# Patient Record
(demographics unavailable — no encounter records)

---

## 2024-10-17 NOTE — DISCUSSION/SUMMARY
[FreeTextEntry1] : ---Assessment plan----------The patient has been referred here for further opinion regarding pulmonary problem, Patient is a 60 Y.O. female with history of mild intermittent asthma, PE 2019, emphysema on CT, former smoker,  breast Ca s/p chemo/rads/mastectomy , history of PE on Coumadin  who returns for follow up. history of multiple sclerosis, SVT , breast ca s/p chemotherapy and bilateral mastectomy in 2011 and s/p delayed BRIANNA reconstruction of bilateral breasts (wound vac on abdomen for slow healing).    1) psg  -edelmira -noncomplaint to cpap-wt loss advised---  refd for orodental device---to treat EDELMIRA-----  2)h/o  Bilateral PE's- agree with heme for life long a/c. INR checks by heme- yearly echo to evaluate PA pressures---Echo reviewed from April 2022, unchanged--REPEAT ECHO w/Dr MUELLER---NEEDS REEAPT CT in 2025------  3)  history of breast cancer--/lymphadenopathy -F/U ONCOLOGY  Q3m 4) needs PFT annually 5)cards Dr Mueller -will request echo-report 6) exercise and wt loss discussed- referred to wt management 7) UTD w/flu shot 8) in 4-5m   Thanks for allowing  me to participate  in the care of this patient.  Patient at this time  will follow  the above mentioned recommendations and return back for follow up visit. If you have any questions  I can be reached  at #[phone] (office).  Floridalma Delgado MD, [facility]

## 2024-10-17 NOTE — HISTORY OF PRESENT ILLNESS
[Never] : never [TextBox_4] : This letter  is regarding your patient  who  attended pulmonary out patient office today.  I have reviewed  patient's  past history, social history, family history and medication list. I also  reviewed nurse practitioners/ and fellows  notes and assessment and agree with it.   The patient was referred by Dr. Johnson  61 y/o woman who complained of back pain in July 2019. Admitted to [facility] for PE. Sent home on Lovenox. She has a history of breast cancer. She has a partial sternectomy on July 12, 2019. There is no history of a prior DVT or PE. No know family history of DVT or PE. She is S/P bilateral mastectomy. history of multiple sclerosis, SVT , breast ca s/p chemotherapy and bilateral mastectomy in 2011 and s/p delayed BRIANNA reconstruction of bilateral breasts (wound vac on abdomen for slow healing).   She had some shortness of breath in Octobe 2023- r. A CT angiogram was negative for PE. She is now on warfarin as per her hematologist/oncologist.     ------No history of , fever, chills , rigors, chest pain, or hemoptysis. Questionable history of Raynaud's phenomenon. No h/o significant weight loss in last few months. No history of liver dysfunction , collagen vascular disorder or chronic thromboembolic disease. I would classify the patient's dyspnea as WHO  FUNCTIONAL CLASS II--------  ----Echo  date------2021----ef  57%, normal rv NM Bone scan on 4/29/19 reveals a focus of increased tracer uptake in the lateral aspect of the right sternum corresponding to sclerosis seen on CT.  PSG   2020  --MILD  -RAFFAELE  --AHI 7.1  PFT 2020-------NORMAL  CT chest scan 4/6/19 reveals new area of sclerosis and cortical erosion in the right lateral aspect of the sternum.    -CT Chest 10/31/19: No pulmonary emboli were seen. No pleural effusion. No lymphadenopathy. Emphysema suspected.   cta chest 6/2021 neg for PE   CT CHEST 1/2023 IMPRESSION: Right subpectoral and supraclavicular lymph nodes are unchanged in size and appearance since 12/2/2022 PET/CT exam. No new findings within the chest   PET/CT 12/2022 IMPRESSION: Since prior FDG-PET/CT scan 3/6/2021: 1. Hypermetabolic uptake in multiple RIGHT axillary nodes, including a previously seen nonspecific node, as well as at least 3 NEW examples. Malignant involvement is not excluded. 2. Further involution of previously seen LEFT breast seroma with associated mild uptake. No NEW suspicious findings otherwise seen in that region. 3. Stable morphologic appearance of previously seen postsurgical changes at the sternum, with mildly decreased associated activity. 4. Previously seen tiny loop of small bowel NO longer seems to be interposed between the rectus musculature and LEFT LOWER quadrant ventral herniorrhaphy mesh   CT CHEST IC - ORDERED BY: GUME FLORENCE PROCEDURE DATE: 02/15/2024 COMPARISON: Chest CT 8/29/2023 FINDINGS: Lungs/Airways/Pleura: The central airways are patent. No pleural effusion. Mild apical paraseptal emphysema. Stable 3 mm left upper lobe nodule on series 2, image 37 and previously new 2 mm left upper lobe nodule on image 52. There is a new 3 mm left lower lobe nodule on image 113. Mediastinum/Lymph nodes: Stable subcentimeter right axillary and subpectoral lymph nodes. No new/enlarging thoracic adenopathy. Left axillary karla dissection. Heart and Vessels: The great vessels are normal in size. The heart is normal in size. No pericardial effusion. Upper Abdomen: Cholecystectomy. Unchanged mild nodularity along the anterior abdominal wall may be postoperative. Osseous structures and Soft Tissues: No aggressive bone lesions. Old sternal fracture. Bilateral mastectomies with flap reconstruction. Stable small left breast seroma with asymmetric overlying skin thickening. IMPRESSION: New nonspecific 3 mm left lower lobe nodule compared to August 2023. Attention on follow-up imaging. Stable sub-4 mm left upper lobe nodules, including the previously new 2 mm nodule. Stable subcentimeter right axillary/subpectoral lymph nodes.    PFT 11/20/19: Spirometry was normal. Lung volumes were normal. There was a mild reduction in diffusion. No change after bronchodilator.    sternal lesion found on imaging  by Dr. Syed. Patient underwent sternal biopsy on 5/23/19 which was non diagnostic s/p resection of lesion 2019 by Dr Cooper Results  [id number] 02:28PM  Pathology  [facility]: Jose Accession Number : [id number] Surgical Final Report Final Diagnosis 1. Bone, sternum, coil, extraction - Coiled portions of metal (gross only)  2. Bone, sternum, right partial sternectomy - Bone tissue with histiocytic proliferation and giant cells, (cytokeratin stain AE1:AE3 is negative (2B,2C,2D,2E)  8/11/2020 Patient feeling well, JOHNSON with 3 blocks, no wheezing, last MS flare 3 years ago, last asthma flare was 2 years ago. Does not have symptoms at baseline. + snoring with BMI of 44  8/2020 PFT- Normal delon, lung volume and DLCO, decreased ERV.   feb 2021-----feels better awaiting CPAP, on Coumadin INR being maintained by her PMD needs an echocardiogram-----Dr. cooper has ordered a CT PET to evaluate her sternum history of breast cancer  may 2021=====  -feels better on  CPAP, --------- will obtain compliance report  ----------------on Coumadin INR being maintained by her PMD needs an echocardiogram-----Dr. cooper has ordered a CT PET to evaluate her sternum history of breast cancer-----   9/2021 raffaele- stopped using cpap d/t recall s/p hernia repair- tolerated surgery well- no problems. Has upcoming colonoscopy at [facility] next week no current resp complaints. on lifelong coumadin d/t pMh PE she is up to date w/covid and influenza vac   11/2021 raffaele on cpap and benefits from its nighty use no current resp complaints. on lifelong coumadin d/t pMh PE she is up to date w/covid and influenza vac   1/2022 raffaele on cpap and benefits from its nighty use- s/p mask fitting- awaiting replacement of recalled device Occasional JOHNSON. h/o PE on coumadin- dosed by heme s/p recent breast surgery for cyst of breast she is up to date w/covid and influenza vac  may 2022  -------    c/o pain abdomen-------? pancreatitis----- awaiting ultrasound of the abdomen--using CPAP-----  August 18 2022 -------raffaele cpap compliant some nights, she is worried about the recall---s/o mask fitting---awating replacement of recalled device denies JOHNSON, chest pain, palpation- h/o of PE- on Coumadin- followed by PCP  s/p b/l mastectomy, follows with plastic surgery abdominal pain is better----possible IBS, following with GI and hepatology She denies shortness of breath, cough, and fever   2/13/2023 being worked up for ? recurrence of breast cancer- o/s CT guided biopsy of right breast lesion RAFFAELE- not compliant with cpap use No present resp complaints  CT CHEST 8/2023 IMPRESSION:  Unchanged small right subpectoral and axillary lymph nodes.  Punctate left upper lobe nodule, not definitely on prior's although may have been obscured by dependent atelectasis. Recommend follow-up on subsequent surveillance imaging.   1/2024 s/p parathyroid surgery 2023--- stable from pulm perspective- reports mild JOHNSON which she attributes to being overweight/deconditioned reports breast workup was negative for breast ca- o/s repeat breast CT OSA_ non complaint to cpap Follows cards Dr Mueller who does annual echos bipedal edema- on lasix daily   CT CHEST 2/2024 IMPRESSION: New nonspecific 3 mm left lower lobe nodule compared to August 2023.  Attention on follow-up imaging.  Stable sub-4 mm left upper lobe nodules, including the previously new 2  mm nodule.  Stable subcentimeter right axillary/subpectoral lymph nodes.  PET/CT 4/2024 IMPRESSION: Compared to FDG-PET/CT scan dated 12/2/2022:  1. Few small FDG-avid right axillary/retropectoral lymph nodes are mildly increased in size and metabolism, a difficult to delineate right presacral lymph node is increased in metabolism, and several small FDG-avid right inguinal lymph nodes are unchanged in size and increased in metabolism. Findings are nonspecific. Differential diagnosis includes lymphoma. Metastatic breast cancer is unlikely. Further evaluation may be performed with ultrasound-guided percutaneous needle biopsy.  2. Small FDG-avid focus in anterior abdominal wall, just to the left of midline, and slightly above the level of the umbilicus, not significantly changed, possibly related to prior surgery. Please correlate clinically and with ultrasound.  4/2024 no pulm issues- here to review PET/CT OSA_ non compliant to cpap MS- follows neuro Cards Dr Mueller o/s echo- -- being considered for lymph node biopsy-----no contraindication to the planned procedure from pulmonary point of view-she should bring her CPAP machine on the day of surgery so that it can be used in the postop.  If necessary   10/2024 Obesity/dyastolic dysfunction OSA_ non compliant to cpap- willing to pursue oral dental device MS- follows neuro Cards Dr Mueller- recent echo-report unavailable Elevated LFTs -follows Dr Wolf history of breast cancer--/lymphadenopathy -F/U ONCOLOGY  Q3m [TextBox_19] : NO RESIDUAL EDS  --ESS WAS  3 ON FEB 2023 [ESS] : 3

## 2024-10-17 NOTE — COUNSELING
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

## 2024-10-17 NOTE — HISTORY OF PRESENT ILLNESS
[Never] : never [TextBox_4] : This letter  is regarding your patient  who  attended pulmonary out patient office today.  I have reviewed  patient's  past history, social history, family history and medication list. I also  reviewed nurse practitioners/ and fellows  notes and assessment and agree with it.   The patient was referred by Dr. Johnson  59 y/o woman who complained of back pain in July 2019. Admitted to [facility] for PE. Sent home on Lovenox. She has a history of breast cancer. She has a partial sternectomy on July 12, 2019. There is no history of a prior DVT or PE. No know family history of DVT or PE. She is S/P bilateral mastectomy. history of multiple sclerosis, SVT , breast ca s/p chemotherapy and bilateral mastectomy in 2011 and s/p delayed BRIANNA reconstruction of bilateral breasts (wound vac on abdomen for slow healing).   She had some shortness of breath in Octobe 2023- r. A CT angiogram was negative for PE. She is now on warfarin as per her hematologist/oncologist.     ------No history of , fever, chills , rigors, chest pain, or hemoptysis. Questionable history of Raynaud's phenomenon. No h/o significant weight loss in last few months. No history of liver dysfunction , collagen vascular disorder or chronic thromboembolic disease. I would classify the patient's dyspnea as WHO  FUNCTIONAL CLASS II--------  ----Echo  date------2021----ef  57%, normal rv NM Bone scan on 4/29/19 reveals a focus of increased tracer uptake in the lateral aspect of the right sternum corresponding to sclerosis seen on CT.  PSG   2020  --MILD  -RAFFAELE  --AHI 7.1  PFT 2020-------NORMAL  CT chest scan 4/6/19 reveals new area of sclerosis and cortical erosion in the right lateral aspect of the sternum.    -CT Chest 10/31/19: No pulmonary emboli were seen. No pleural effusion. No lymphadenopathy. Emphysema suspected.   cta chest 6/2021 neg for PE   CT CHEST 1/2023 IMPRESSION: Right subpectoral and supraclavicular lymph nodes are unchanged in size and appearance since 12/2/2022 PET/CT exam. No new findings within the chest   PET/CT 12/2022 IMPRESSION: Since prior FDG-PET/CT scan 3/6/2021: 1. Hypermetabolic uptake in multiple RIGHT axillary nodes, including a previously seen nonspecific node, as well as at least 3 NEW examples. Malignant involvement is not excluded. 2. Further involution of previously seen LEFT breast seroma with associated mild uptake. No NEW suspicious findings otherwise seen in that region. 3. Stable morphologic appearance of previously seen postsurgical changes at the sternum, with mildly decreased associated activity. 4. Previously seen tiny loop of small bowel NO longer seems to be interposed between the rectus musculature and LEFT LOWER quadrant ventral herniorrhaphy mesh   CT CHEST IC - ORDERED BY: GUME FLORENCE PROCEDURE DATE: 02/15/2024 COMPARISON: Chest CT 8/29/2023 FINDINGS: Lungs/Airways/Pleura: The central airways are patent. No pleural effusion. Mild apical paraseptal emphysema. Stable 3 mm left upper lobe nodule on series 2, image 37 and previously new 2 mm left upper lobe nodule on image 52. There is a new 3 mm left lower lobe nodule on image 113. Mediastinum/Lymph nodes: Stable subcentimeter right axillary and subpectoral lymph nodes. No new/enlarging thoracic adenopathy. Left axillary karla dissection. Heart and Vessels: The great vessels are normal in size. The heart is normal in size. No pericardial effusion. Upper Abdomen: Cholecystectomy. Unchanged mild nodularity along the anterior abdominal wall may be postoperative. Osseous structures and Soft Tissues: No aggressive bone lesions. Old sternal fracture. Bilateral mastectomies with flap reconstruction. Stable small left breast seroma with asymmetric overlying skin thickening. IMPRESSION: New nonspecific 3 mm left lower lobe nodule compared to August 2023. Attention on follow-up imaging. Stable sub-4 mm left upper lobe nodules, including the previously new 2 mm nodule. Stable subcentimeter right axillary/subpectoral lymph nodes.    PFT 11/20/19: Spirometry was normal. Lung volumes were normal. There was a mild reduction in diffusion. No change after bronchodilator.    sternal lesion found on imaging  by Dr. Syed. Patient underwent sternal biopsy on 5/23/19 which was non diagnostic s/p resection of lesion 2019 by Dr Cooper Results  [id number] 02:28PM  Pathology  [facility]: Jose Accession Number : [id number] Surgical Final Report Final Diagnosis 1. Bone, sternum, coil, extraction - Coiled portions of metal (gross only)  2. Bone, sternum, right partial sternectomy - Bone tissue with histiocytic proliferation and giant cells, (cytokeratin stain AE1:AE3 is negative (2B,2C,2D,2E)  8/11/2020 Patient feeling well, JOHNSON with 3 blocks, no wheezing, last MS flare 3 years ago, last asthma flare was 2 years ago. Does not have symptoms at baseline. + snoring with BMI of 44  8/2020 PFT- Normal delon, lung volume and DLCO, decreased ERV.   feb 2021-----feels better awaiting CPAP, on Coumadin INR being maintained by her PMD needs an echocardiogram-----Dr. cooper has ordered a CT PET to evaluate her sternum history of breast cancer  may 2021=====  -feels better on  CPAP, --------- will obtain compliance report  ----------------on Coumadin INR being maintained by her PMD needs an echocardiogram-----Dr. cooper has ordered a CT PET to evaluate her sternum history of breast cancer-----   9/2021 raffaele- stopped using cpap d/t recall s/p hernia repair- tolerated surgery well- no problems. Has upcoming colonoscopy at [facility] next week no current resp complaints. on lifelong coumadin d/t pMh PE she is up to date w/covid and influenza vac   11/2021 raffaele on cpap and benefits from its nighty use no current resp complaints. on lifelong coumadin d/t pMh PE she is up to date w/covid and influenza vac   1/2022 raffaele on cpap and benefits from its nighty use- s/p mask fitting- awaiting replacement of recalled device Occasional JOHNSON. h/o PE on coumadin- dosed by heme s/p recent breast surgery for cyst of breast she is up to date w/covid and influenza vac  may 2022  -------    c/o pain abdomen-------? pancreatitis----- awaiting ultrasound of the abdomen--using CPAP-----  August 18 2022 -------raffaele cpap compliant some nights, she is worried about the recall---s/o mask fitting---awating replacement of recalled device denies JOHNSON, chest pain, palpation- h/o of PE- on Coumadin- followed by PCP  s/p b/l mastectomy, follows with plastic surgery abdominal pain is better----possible IBS, following with GI and hepatology She denies shortness of breath, cough, and fever   2/13/2023 being worked up for ? recurrence of breast cancer- o/s CT guided biopsy of right breast lesion RAFFAELE- not compliant with cpap use No present resp complaints  CT CHEST 8/2023 IMPRESSION:  Unchanged small right subpectoral and axillary lymph nodes.  Punctate left upper lobe nodule, not definitely on prior's although may have been obscured by dependent atelectasis. Recommend follow-up on subsequent surveillance imaging.   1/2024 s/p parathyroid surgery 2023--- stable from pulm perspective- reports mild JOHNSON which she attributes to being overweight/deconditioned reports breast workup was negative for breast ca- o/s repeat breast CT OSA_ non complaint to cpap Follows cards Dr Mueller who does annual echos bipedal edema- on lasix daily   CT CHEST 2/2024 IMPRESSION: New nonspecific 3 mm left lower lobe nodule compared to August 2023.  Attention on follow-up imaging.  Stable sub-4 mm left upper lobe nodules, including the previously new 2  mm nodule.  Stable subcentimeter right axillary/subpectoral lymph nodes.  PET/CT 4/2024 IMPRESSION: Compared to FDG-PET/CT scan dated 12/2/2022:  1. Few small FDG-avid right axillary/retropectoral lymph nodes are mildly increased in size and metabolism, a difficult to delineate right presacral lymph node is increased in metabolism, and several small FDG-avid right inguinal lymph nodes are unchanged in size and increased in metabolism. Findings are nonspecific. Differential diagnosis includes lymphoma. Metastatic breast cancer is unlikely. Further evaluation may be performed with ultrasound-guided percutaneous needle biopsy.  2. Small FDG-avid focus in anterior abdominal wall, just to the left of midline, and slightly above the level of the umbilicus, not significantly changed, possibly related to prior surgery. Please correlate clinically and with ultrasound.  4/2024 no pulm issues- here to review PET/CT OSA_ non compliant to cpap MS- follows neuro Cards Dr Mueller o/s echo- -- being considered for lymph node biopsy-----no contraindication to the planned procedure from pulmonary point of view-she should bring her CPAP machine on the day of surgery so that it can be used in the postop.  If necessary   10/2024 Obesity/dyastolic dysfunction OSA_ non compliant to cpap- willing to pursue oral dental device MS- follows neuro Cards Dr Mueller- recent echo-report unavailable Elevated LFTs -follows Dr Wolf history of breast cancer--/lymphadenopathy -F/U ONCOLOGY  Q3m [TextBox_19] : NO RESIDUAL EDS  --ESS WAS  3 ON FEB 2023 [ESS] : 3

## 2024-10-17 NOTE — PHYSICAL EXAM
[No Acute Distress] : no acute distress [Well Nourished] : well nourished [Normal Appearance] : normal appearance [Supple] : supple [No Neck Mass] : no neck mass [No JVD] : no jvd [Normal Rate/Rhythm] : normal rate/rhythm [Normal S1, S2] : normal s1, s2 [No Murmurs] : no murmurs [No Resp Distress] : no resp distress [Clear to Auscultation Bilaterally] : clear to auscultation bilaterally [No Abnormalities] : no abnormalities [Benign] : benign [Normal Gait] : normal gait [No Clubbing] : no clubbing [No Cyanosis] : no cyanosis [No Edema] : no edema [Normal Color/ Pigmentation] : normal color/ pigmentation [No Focal Deficits] : no focal deficits [Oriented x3] : oriented x3 [Normal Affect] : normal affect [TextBox_11] : Unable to examined due to mask [TextBox_44] : Known thyroid nodule  [TextBox_89] : Obese

## 2024-10-17 NOTE — END OF VISIT
[Time Spent: ___ minutes] : I have spent [unfilled] minutes of time on the encounter which excludes teaching and separately reported services. [FreeTextEntry3] :   I, Dr. Floridalma Delgado  personally performed the evaluation and management (E/M) services for this established patient who presents today with (a) new problem(s)/exacerbation of (an) existing condition(s). That E/M includes conducting the clinically appropriate interval history &/or exam, assessing all new/exacerbated conditions, and establishing a new plan of care. Today, my PANDA, Lori Hernandez NP, , was here to observe my evaluation and management service for this new problem/exacerbated condition and follow the plan of care established by me going forward.

## 2024-10-17 NOTE — DISCUSSION/SUMMARY
[FreeTextEntry1] : ---Assessment plan----------The patient has been referred here for further opinion regarding pulmonary problem, Patient is a 60 Y.O. female with history of mild intermittent asthma, PE 2019, emphysema on CT, former smoker,  breast Ca s/p chemo/rads/mastectomy , history of PE on Coumadin  who returns for follow up. history of multiple sclerosis, SVT , breast ca s/p chemotherapy and bilateral mastectomy in 2011 and s/p delayed BRIANNA reconstruction of bilateral breasts (wound vac on abdomen for slow healing).    1) psg  -edelmira -noncomplaint to cpap-wt loss advised---  refd for orodental device---to treat EDELMIRA-----  2)h/o  Bilateral PE's- agree with heme for life long a/c. INR checks by heme- yearly echo to evaluate PA pressures---Echo reviewed from April 2022, unchanged--REPEAT ECHO w/Dr MUELLER---NEEDS REEAPT CT in 2025------  3)  history of breast cancer--/lymphadenopathy -F/U ONCOLOGY  Q3m 4) needs PFT annually 5)cards Dr Mueller -will request echo-report 6) exercise and wt loss discussed- referred to wt management 7) UTD w/flu shot 8) in 4-5m   Thanks for allowing  me to participate  in the care of this patient.  Patient at this time  will follow  the above mentioned recommendations and return back for follow up visit. If you have any questions  I can be reached  at #[phone] (office).  Floridalma Delgado MD, [facility]

## 2024-10-26 NOTE — PHYSICAL EXAM
[Non-Tender] : non-tender [Smooth] : smooth [General Appearance - Alert] : alert [General Appearance - In No Acute Distress] : in no acute distress [General Appearance - Well Nourished] : well nourished [General Appearance - Well Developed] : well developed [Sclera] : the sclera and conjunctiva were normal [Hearing Threshold Finger Rub Not [place]] : hearing was normal [Oropharynx] : the oropharynx was normal [Respiration, Rhythm And Depth] : normal respiratory rhythm and effort [Exaggerated Use Of Accessory Muscles For Inspiration] : no accessory muscle use [Auscultation Breath Sounds / Voice Sounds] : lungs were clear to auscultation bilaterally [Heart Rate And Rhythm] : heart rate was normal and rhythm regular [Heart Sounds] : normal S1 and S2 [Murmurs] : no murmurs [Edema] : there was no peripheral edema [Bowel Sounds] : normal bowel sounds [Abdomen Soft] : soft [Abdomen Tenderness] : non-tender [Abdomen Mass (___ Cm)] : no abdominal mass palpated [Abnormal Walk] : normal gait [Nail Clubbing] : no clubbing  or cyanosis of the fingernails [Involuntary Movements] : no involuntary movements were seen [Skin Color & Pigmentation] : normal skin color and pigmentation [Skin Turgor] : normal skin turgor [] : no rash [Oriented To Time, Place, And Person] : oriented to person, place, and time [Impaired Insight] : insight and judgment were intact [Affect] : the affect was normal [Mood] : the mood was normal [Memory Recent] : recent memory was not impaired [Memory Remote] : remote memory was not impaired [Scleral Icterus] : No Scleral Icterus [Spider Angioma] : No spider angioma(s) were observed [Abdominal  Ascites] : no ascites [Splenomegaly] : no splenomegaly [Caput Medusae] : no caput medusae observed [Asterixis] : no asterixis observed [Jaundice] : No jaundice [Palmar Erythema] : no Palmar Erythema

## 2024-10-26 NOTE — PHYSICAL EXAM
[Non-Tender] : non-tender [Smooth] : smooth [General Appearance - Alert] : alert [General Appearance - In No Acute Distress] : in no acute distress [General Appearance - Well Nourished] : well nourished [General Appearance - Well Developed] : well developed [Sclera] : the sclera and conjunctiva were normal [Hearing Threshold Finger Rub Not [place]] : hearing was normal [Oropharynx] : the oropharynx was normal [Respiration, Rhythm And Depth] : normal respiratory rhythm and effort [Exaggerated Use Of Accessory Muscles For Inspiration] : no accessory muscle use [Auscultation Breath Sounds / Voice Sounds] : lungs were clear to auscultation bilaterally [Heart Rate And Rhythm] : heart rate was normal and rhythm regular [Heart Sounds] : normal S1 and S2 [Murmurs] : no murmurs [Edema] : there was no peripheral edema [Bowel Sounds] : normal bowel sounds [Abdomen Soft] : soft [Abdomen Tenderness] : non-tender [Abdomen Mass (___ Cm)] : no abdominal mass palpated [Abnormal Walk] : normal gait [Nail Clubbing] : no clubbing  or cyanosis of the fingernails [Involuntary Movements] : no involuntary movements were seen [Skin Color & Pigmentation] : normal skin color and pigmentation [Skin Turgor] : normal skin turgor [] : no rash [Oriented To Time, Place, And Person] : oriented to person, place, and time [Impaired Insight] : insight and judgment were intact [Affect] : the affect was normal [Mood] : the mood was normal [Memory Recent] : recent memory was not impaired [Memory Remote] : remote memory was not impaired [Scleral Icterus] : No Scleral Icterus [Spider Angioma] : No spider angioma(s) were observed [Abdominal  Ascites] : no ascites [Splenomegaly] : no splenomegaly [Caput Medusae] : no caput medusae observed [Asterixis] : no asterixis observed [Jaundice] : No jaundice [Palmar Erythema] : no Palmar Erythema

## 2024-10-26 NOTE — HISTORY OF PRESENT ILLNESS
[FreeTextEntry1] : Ms. Chaudhari is a 59 yo F with obesity, dyslipidemia, RAFFAELE, IBS, multiple sclerosis (on Vumerity), history of SVT (s/p ablation in 2012), asthma, GERD, gastroparesis (s/p pyloric dilation in 9/2021), urolithiasis, history of bilateral PEs (2019, on lifelong anticoagulation with warfarin), history of cholecystectomy, history of hysterectomy, history of umbilical hernia repair, and a history of left breast cancer (diagnosed in 11/2010, s/p neoadjuvant chemotherapy, s/p bilateral mastectomy 5/2011 with subsequent reconstruction, s/p excisional biopsy of right axillary lymph node on 3/14/18 that was benign, s/p partial sternectomy on 7/12/19 that was benign, s/p revision of her bilateral reconstructed breasts with exploration of the left breast, removal of seroma, fat grafting, liposuction 1/10/22, and s/p right axillary lymph node excision on 5/17/2024 that had no overt evidence of malignancy), iron deficiency anemia (on IV iron and planned for video capsule endoscopy on 10/24/24), and metabolic dysfunction-associated steatohepatitis (MASLD).  FibroScan (10/6/22): Median liver stiffness 5.9 kPa (normal, consistent with F0-1 fibrosis) and CAP score 302 dB/m (consistent with S1 steatosis).  FibroScan (10/11/23): Median liver stiffness 5.2 kPa (normal, consistent with F0-1 fibrosis) and CAP score 282 dB/m (consistent with S0-1 steatosis).  FibroScan (10/16/24): Median liver stiffness 5.3 kPa (normal, consistent with F0-1 fibrosis) and CAP score 264 dB/m (consistent with S0-1 steatosis).  She was last seen in this office on 10/11/23 (1 year ago) and is here today for routine follow-up and repeat FibroScan (above). She has been having frequent cramping abdominal pain daily and is planned for further endoscopic work-up with her gastroenterologist including video capsule endoscopy on 10/24/24, also due to recent iron deficiency anemia for which she has been on IV iron for the past month. She has alternating constipation and diarrhea. No overt GI bleeding. She has lost 4 lbs since last year but has struggled to lose further weight despite reportedly eating less due to her recent GI symptoms. She has limited intake of vegetables due to warfarin as well as fears of exacerbating her GI symptoms. She also has not been exercising because of her symptoms.  She has no known family history of liver disease. Her son has ESRD on HD and previously had bariatric surgery in order to become eligible for kidney transplantation.  She drinks alcohol rarely, maybe a glass of wine or wine cooler once a month or less. She is a former smoker and quit in 2010 when diagnosed with breast cancer. She used to smoke marijuana but none recently. No history of intranasal or intravenous drug use. She is retired but previously worked for the [employer] for 37 years. She has a left shoulder tattoo done by a  at a "tattoo party" at a friend's house many years ago (>10 years ago). She lives with her adult son and also has an adult daughter and two grandchildren (one from each of her children).

## 2024-10-26 NOTE — ASSESSMENT
[FreeTextEntry1] : # Metabolic dysfunction-associated steatotic liver disease (MASLD): - She has no reported history of risky alcohol consumption. Prior laboratory work-up for other causes of chronic liver disease was notable for positive JOSE ANGEL with 1:320 titer and weakly positive ASMA with 1:20 titer, however, IgG was within normal limits and the pattern of her liver enzyme elevations (recently with normal AST and ALT and only mildly elevated ALP) is not suggestive of autoimmune hepatitis. No current plan for biopsy, especially given her reassuring serial FibroScan results which suggest no significant hepatic fibrosis (F0-1). - We discussed the presumed diagnosis of MASLD including its natural history, evaluation and staging of the disease including her FibroScan results, and prognosis, including possible risks of development of compensated cirrhosis, decompensated cirrhosis, and hepatocellular carcinoma (HCC) as well as increased risks of diabetes mellitus, chronic kidney disease, and cardiovascular disease were discussed.  - We discussed that lifestyle modifications are crucial for management of MASLD. I recommended gradual weight loss of at least 10% of her body weight, though provided emotional support that this can be difficult to achieve, especially as she has already tried multiple diets and is currently limited due to her GI symptomatology. At her prior visit in 10/2023, she was provided with the phone number for the [facility] at [facility] for consideration of possible pharmacologic therapy for obesity (apart from GLP-1 RA which she is not a candidate for due to her gastroparesis) as well as in-person regular dietician consultation and follow-up. At her visit in 10/2023, we had also discussed trying Weight Watchers as an alternative to aim for daily 250-500 calorie deficit for consistent weight loss. She also previously had a consultation with a bariatric surgeon but was felt to be high risk due to her history of PEs. Today, she expressed frustration about an over-focus by multiple providers on her weight so we opted instead to focus on other potential healthy lifestyle changes as well as reassurance about the overall stable status of her MASLD based on serial FibroScan measurements. I recommended dietary changes as tolerated including coffee consumption (up to 3-4 cups/day if desired), adherence to a Mediterranean style diet with increased consumption of vegetables and lean proteins, avoidance of red meat and high fructose corn syrup, and avoidance of calorie-containing beverages. I recommended moderate intensity exercise for a minimum of 20-30 minutes at least 3 times per week and ideally 120-150 minutes/week. These changes have been shown to lead to regression or even resolution of steatosis, inflammation, and even fibrosis in some patients. - She is not a candidate for resmetirom given that she does not appear to have stage 2-3 fibrosis. She is not a candidate for off label GLP-1 RA therapy due to gastroparesis.   # Health maintenance: - Ms. KOEHLER was counseled to: abstain from alcohol; avoid use of herbal and dietary supplements due to potential hepatotoxicity; and limit use of acetaminophen to <2 grams per day. - She was advised to follow-up with her GI re: her GI symptomatology and iron deficiency anemia as planned. - She is HAV and HBV immune.  Next follow-up: 2 years, with same-day FibroScan

## 2024-10-26 NOTE — REVIEW OF SYSTEMS
[Negative] : Heme/Lymph [Abdominal Pain] : abdominal pain [Constipation] : constipation [Diarrhea] : diarrhea [Swollen Glands] : swollen glands [As Noted in HPI] : as noted in HPI [Feeling Poorly] : feeling poorly

## 2024-10-26 NOTE — HISTORY OF PRESENT ILLNESS
[FreeTextEntry1] : Ms. Chaudhari is a 61 yo F with obesity, dyslipidemia, RAFFAELE, IBS, multiple sclerosis (on Vumerity), history of SVT (s/p ablation in 2012), asthma, GERD, gastroparesis (s/p pyloric dilation in 9/2021), urolithiasis, history of bilateral PEs (2019, on lifelong anticoagulation with warfarin), history of cholecystectomy, history of hysterectomy, history of umbilical hernia repair, and a history of left breast cancer (diagnosed in 11/2010, s/p neoadjuvant chemotherapy, s/p bilateral mastectomy 5/2011 with subsequent reconstruction, s/p excisional biopsy of right axillary lymph node on 3/14/18 that was benign, s/p partial sternectomy on 7/12/19 that was benign, s/p revision of her bilateral reconstructed breasts with exploration of the left breast, removal of seroma, fat grafting, liposuction 1/10/22, and s/p right axillary lymph node excision on 5/17/2024 that had no overt evidence of malignancy), iron deficiency anemia (on IV iron and planned for video capsule endoscopy on 10/24/24), and metabolic dysfunction-associated steatohepatitis (MASLD).  FibroScan (10/6/22): Median liver stiffness 5.9 kPa (normal, consistent with F0-1 fibrosis) and CAP score 302 dB/m (consistent with S1 steatosis).  FibroScan (10/11/23): Median liver stiffness 5.2 kPa (normal, consistent with F0-1 fibrosis) and CAP score 282 dB/m (consistent with S0-1 steatosis).  FibroScan (10/16/24): Median liver stiffness 5.3 kPa (normal, consistent with F0-1 fibrosis) and CAP score 264 dB/m (consistent with S0-1 steatosis).  She was last seen in this office on 10/11/23 (1 year ago) and is here today for routine follow-up and repeat FibroScan (above). She has been having frequent cramping abdominal pain daily and is planned for further endoscopic work-up with her gastroenterologist including video capsule endoscopy on 10/24/24, also due to recent iron deficiency anemia for which she has been on IV iron for the past month. She has alternating constipation and diarrhea. No overt GI bleeding. She has lost 4 lbs since last year but has struggled to lose further weight despite reportedly eating less due to her recent GI symptoms. She has limited intake of vegetables due to warfarin as well as fears of exacerbating her GI symptoms. She also has not been exercising because of her symptoms.  She has no known family history of liver disease. Her son has ESRD on HD and previously had bariatric surgery in order to become eligible for kidney transplantation.  She drinks alcohol rarely, maybe a glass of wine or wine cooler once a month or less. She is a former smoker and quit in 2010 when diagnosed with breast cancer. She used to smoke marijuana but none recently. No history of intranasal or intravenous drug use. She is retired but previously worked for the [employer] for 37 years. She has a left shoulder tattoo done by a  at a "tattoo party" at a friend's house many years ago (>10 years ago). She lives with her adult son and also has an adult daughter and two grandchildren (one from each of her children).

## 2024-10-26 NOTE — ASSESSMENT
[FreeTextEntry1] : # Metabolic dysfunction-associated steatotic liver disease (MASLD): - She has no reported history of risky alcohol consumption. Prior laboratory work-up for other causes of chronic liver disease was notable for positive JOSE ANGEL with 1:320 titer and weakly positive ASMA with 1:20 titer, however, IgG was within normal limits and the pattern of her liver enzyme elevations (recently with normal AST and ALT and only mildly elevated ALP) is not suggestive of autoimmune hepatitis. No current plan for biopsy, especially given her reassuring serial FibroScan results which suggest no significant hepatic fibrosis (F0-1). - We discussed the presumed diagnosis of MASLD including its natural history, evaluation and staging of the disease including her FibroScan results, and prognosis, including possible risks of development of compensated cirrhosis, decompensated cirrhosis, and hepatocellular carcinoma (HCC) as well as increased risks of diabetes mellitus, chronic kidney disease, and cardiovascular disease were discussed.  - We discussed that lifestyle modifications are crucial for management of MASLD. I recommended gradual weight loss of at least 10% of her body weight, though provided emotional support that this can be difficult to achieve, especially as she has already tried multiple diets and is currently limited due to her GI symptomatology. At her prior visit in 10/2023, she was provided with the phone number for the [facility] Management Center at [facility] for consideration of possible pharmacologic therapy for obesity (apart from GLP-1 RA which she is not a candidate for due to her gastroparesis) as well as in-person regular dietician consultation and follow-up. At her visit in 10/2023, we had also discussed trying Weight Watchers as an alternative to aim for daily 250-500 calorie deficit for consistent weight loss. She also previously had a consultation with a bariatric surgeon but was felt to be high risk due to her history of PEs. Today, she expressed frustration about an over-focus by multiple providers on her weight so we opted instead to focus on other potential healthy lifestyle changes as well as reassurance about the overall stable status of her MASLD based on serial FibroScan measurements. I recommended dietary changes as tolerated including coffee consumption (up to 3-4 cups/day if desired), adherence to a Mediterranean style diet with increased consumption of vegetables and lean proteins, avoidance of red meat and high fructose corn syrup, and avoidance of calorie-containing beverages. I recommended moderate intensity exercise for a minimum of 20-30 minutes at least 3 times per week and ideally 120-150 minutes/week. These changes have been shown to lead to regression or even resolution of steatosis, inflammation, and even fibrosis in some patients. - She is not a candidate for resmetirom given that she does not appear to have stage 2-3 fibrosis. She is not a candidate for off label GLP-1 RA therapy due to gastroparesis.   # Health maintenance: - Ms. KOEHLER was counseled to: abstain from alcohol; avoid use of herbal and dietary supplements due to potential hepatotoxicity; and limit use of acetaminophen to <2 grams per day. - She was advised to follow-up with her GI re: her GI symptomatology and iron deficiency anemia as planned. - She is HAV and HBV immune.  Next follow-up: 2 years, with same-day FibroScan

## 2024-12-10 NOTE — ASSESSMENT
[FreeTextEntry1] : Ann Marie will follow-up in 1 year, she will continue close follow-up with Dr. Syed and rheumatology.   All medical entries were at my, Dr. Kane Mooney, direction. I have reviewed the chart and agree that the record accurately reflects my personal performance of the history, physical exam, assessment, and plan.  Our office nurse practitioner was present for the duration of the office visit.

## 2024-12-10 NOTE — PHYSICAL EXAM
[Normal] : supple, no neck mass and thyroid not enlarged [Normal Neck Lymph Nodes] : normal neck lymph nodes  [Normal Supraclavicular Lymph Nodes] : normal supraclavicular lymph nodes [Normal Groin Lymph Nodes] : normal groin lymph nodes [Normal Axillary Lymph Nodes] : normal axillary lymph nodes [Normal] : oriented to person, place and time, with appropriate affect [FreeTextEntry1] : SS present during physical exam.  [de-identified] : right axillary incision well healed

## 2024-12-10 NOTE — PHYSICAL EXAM
[Normal] : supple, no neck mass and thyroid not enlarged [Normal Neck Lymph Nodes] : normal neck lymph nodes  [Normal Supraclavicular Lymph Nodes] : normal supraclavicular lymph nodes [Normal Groin Lymph Nodes] : normal groin lymph nodes [Normal Axillary Lymph Nodes] : normal axillary lymph nodes [Normal] : oriented to person, place and time, with appropriate affect [FreeTextEntry1] : SS present during physical exam.  [de-identified] : right axillary incision well healed

## 2024-12-10 NOTE — HISTORY OF PRESENT ILLNESS
[de-identified] : Ms. JANETH KOEHLER is a 60-year-old woman here for a follow-up visit.   She underwent bilateral mastectomies in November 2010.  She was seen for initial consultation in December 2017 for evaluation of right axillary lymphadenopathy and a history of left sided breast cancer.   She was referred for a CTA of the chest in August 2017 given complaints of dyspnea, which was negative for PE but revealed right-sided axillary adenopathy measuring up to 1.9 cm.  She was referred for a bilateral breast/chest wall ultrasound in November 2017, which revealed benign appearing nodules in the right breast for which a 6 month follow up exam was recommended, and an unremarkable axillary lymph node with normal fatty hilum (BIRADS 3).  She has also been experiencing abdominal/epigastric pain of unclear etiology.  She has been worked up by GI, Cardiology and neurology with no significant findings.  She had a recent repeat EGD (7/2018) and CT (6/2018) which were both unrevealing.  She will continue to follow up with Dr. Hernandez regarding this.  She was prescribed Hyoscyamine and Baclofen.  She was subsequently referred for a breast MRI which showed right axillary adenopathy (3.4 cm) for which FNA was recommended.  FNA was done on 1/9/18 and was negative for malignant cells and consistent with a reactive lymph node.  As such, we agreed to short term surveillance with a follow up ultrasound.  **SURGERY** After discussing the case with radiology, we decided to proceed with an excisional biopsy.  She is status post excisional biopsy of right axillary lymph node on 3/14/18.  Final pathology was consistent with lymph nodes with follicular hyperplasia and plasmacytosis.  No diagnostic abnormalities were seen on flow cytometry.  US bilateral breasts 9/30/20: No sonographic evidence of malignancy.  Further management of pain should be based clinically, BI-RADS 1.   MR Bilateral Breasts 10/30/20: No MRI evidence of malignancy, BI-RADS 2.   She underwent breast reconstruction revision in December 2020.   CT Abdomen/Pelvis 2/11/21: Tiny portion of small bowel interposed between the rectus musculature and ventral hernia mesh in the left lower quadrant. No hernia identified. No bowel obstruction.  PET/CT Skull to Thigh 3/4/21:  1)Patient is status post interval removal of bilateral breast implants with BRIANNA flap reconstruction, as seen on interval CT scans. In anterior aspect of reconstructed left breast, there is a photopenic density which measures slightly higher than simple fluid and demonstrate minimal, peripheral FDG activity. This increased in size since 10/31/2019, measuring approximately 9.2 x 2.8 cm (SUV 2.3; image 81), as compared to 4.3 x 1.2 cm on 10/31/2019. No suspicious enhancement is seen in reconstructed left breast on MRI from 10/30/2020. This may represent a postoperative seroma. Ultrasound is recommended for further evaluation.  2. Status post interval partial right sternectomy with chronic fracture deformity of mid sternum as seen on interval CT scans. There is a small focus of mild FDG activity associated with the fracture deformity which is unchanged in appearance on interval CT scans. Resolution of infiltrative changes seen in chest wall, surrounding sternal fracture deformity, on interval CT scans. 3. New nonspecific subcentimeter FDG-avid right axillary lymph node. 4. A tiny portion of small bowel is interposed between the rectus musculature and the ventral hernia mesh in the left lower quadrant, as seen on CT dated 2/11/2021.  US Breast Bilateral 3/22/21: Large complicated fluid collection in the reconstructed left upper breast with internal debris and septations, new since prior breast MRI from 10/2020. The differential considerations include inflammation/infection, however malignant recurrence or implant associated lymphoma cannot be excluded. Ultrasound-guided aspiration is recommended. Further management and assessment on clinical grounds is also suggested.  BI-RADS 4A.   BREAST, LEFT UPPER, US GUIDED FNA 4/6/21:  NEGATIVE FOR MALIGNANT CELLS. (Results are BENIGN AND CONCORDANT).  She underwent umbilical revision on 4/16/21.   She was referred for a CTA of the chest on 6/13/21 to evaluate shortness of breath, which was negative for PE but demonstrated a 8 x 1.8 cm left breast collection (previously aspirated).  This has been asymptomatic, however, she briefly discussed surgical intervention for definitive treatment with Dr. Vasquez.  She subsequently underwent a  follow up breast ultrasound on 9/1/21 which revealed an 8.3 x 4.3 x 0.4 cm fluid collection in the upper reconstructed left breast at the scar site, decreased in size from prior study (BIRADS 2).   Her past medical history is otherwise notable for SVT (s/p cardiac ablation 2012- currently on metoprolol), MS, GERD.  Prior surgeries include a cholecystectomy, hysterectomy and umbilical hernia repair.  B/L breast U/S from 11/29/22 showed probable fat necrosis in the right breast at 2:00, 7 cm from the nipple, at the medial portion of the scar site. Follow-up sonogram in 6 months is recommended to ascertain stability. BI-RADS 3 Breast MRI 12/2/22 with benign findings, BI-RADS 2.   12/22/2022- Janeth presents for a follow-up visit, she is most recently s/p revision of her B/L reconstructed breast with exploration of left breast, seroma removal, fat grafting and liposuction in January of 2022 w/ Dr. Vasquez.  PET/CT from 12/2022 shows new hypermetabolic uptake in multiple right axillary nodes, at least 3 new examples. Dr. Syed then sent her back to me to discuss new findings. She is also undergoing multiple GI testing with Dr. Harris and parathyroid work-up with Dr. Cazares. Janeth will undergo as right ultrasound guided biopsy of the axillary lymph nodes. She will follow up with us shortly thereafter.  Spoke with Janeth on 1/7/2023, attempt at US biopsy showed no discreet LN, will order a chest CT to reevaluate.   CT chest from 1/15/2023 showed right subpectoral and supraclavicular lymph nodes are unchanged in size and appearance since 12/2/2022 PET/CT exam. No new findings within the chest  1/26/2023: Janeth returns to discuss her CT scan findings.  She does mention occasional night sweats and fatigue as constitutional symptoms. I will review her CT imaging with radiology.  She may be amenable to a CT-guided biopsy.  We will touch base next week.  s/p CT guided bx of retropectoral LN on 3/10/2023 favors reactive LN.  B/L breast U/S 5/31/2023, BI-RADS 2 --- however there were no comments made on her axilla regions. right axilla U/S 7/14/2023 - no evidence of LAD, previously biopsied right retro pectoral LN not seen   CT chest 8/29/2023 - unchanged right subpectoral & axillary LN   9/21/2023 - Janeth returns for ongoing follow-up, she does still report night sweats but otherwise no remaining B symptoms. No LAD on physical exam and she otherwise is in her usual state of health.   CT chest 2/2024 - new nonspecific 3 mm LLL nodule, compared to 8/2023 - stable sub-4 mm DARRYL nodules, including previously new 2 mm nodule  - stable sub centimeter right axillary/subpectoral LN   3/21/2024 - Janeth returns for ongoing follow-up, she is feeling well but does report ongoing night sweats 3-4 nights a week. She has been dealing with some GI issues/IBS but otherwise denies any new health issues.   PET/CT 4/7/2024: Few small FDG-avid right axillary/retropectoral lymph nodes are mildly increased in size and metabolism, a difficult to delineate right presacral lymph node is increased in metabolism, and several small FDG-avid right inguinal lymph nodes are unchanged in size and increased in metabolism. Findings are nonspecific. Differential diagnosis includes lymphoma. Metastatic breast cancer is unlikely. Further evaluation may be performed with ultrasound-guided percutaneous needle biopsy. Small FDG-avid focus in anterior abdominal wall, just to the left of midline, and slightly above the level of the umbilicus, not significantly changed, possibly related to prior surgery. Please correlate clinically and with ultrasound.  5/13/2024- Janeth returns for a presurgical consultation and to evaluate abdominal wall findings on PET/CT.  Janeth is scheduled for surgery 5/17.    **SURGERY** She is status post right axillary lymph node excision on 5/17/2024.  Pathology revealed follicular and paracortical hyperplasia with NO overt evidence of lymphoma or carcinoma.     Postop course complicated by hematoma for which she underwent wash out/evacuation of right axillary hematoma on 5/20/2024.  Hospital course prolonged due to inability to achieve therapeutic anticoagulation.  6/10/2024: Janeth is feeling well.  Incision is healing appropriately, and she denies any further drainage, no swelling or pain.  Therapeutic on anticoagulation at this point. Janeth is doing well.  She will follow up with us in 6 months.  CT C/A/P 8/26/2024 - no evidence of metastatic disease - post op changes in left breast, similar to prior CT  - no LAD, previously noted hematoma to right axilla no longer seen, surgical clips to B/L axilla   9/19/2024 - Janeth returns for ongoing follow-up, she continues to report persistent night sweats and intermittently during the day. She recently recovered from a bout of GI issues (Sapovirus, E. coli, and C. diff that were identified by GI PCR and treated with ABTs). She is otherwise in her usual state of health and denies any new/worrisome symptoms.  Janeth will follow-up in 1 year, she will continue close follow-up with Dr. Syed and possibly rheumatology.   12/10/2024- Janeth returns for ongoing follow-up, she is feeling well overall, still dealing with some GI issues but actively following w/ GI. She continues to have persistent night sweats but has established care w/ Dr. Zita Segura from [facility] who is doing the full work-up. She otherwise denies any new or worrisome symptoms.

## 2024-12-10 NOTE — CONSULT LETTER
[Dear  ___] : Dear ~PRANEETH, [Consult Letter:] : I had the pleasure of evaluating your patient, [unfilled]. [Please see my note below.] : Please see my note below. [Consult Closing:] : Thank you very much for allowing me to participate in the care of this patient.  If you have any questions, please do not hesitate to contact me. [Sincerely,] : Sincerely, [DrLane  ___] : Dr. BUCKLEY [DrLane ___] : Dr. BUCKLEY [FreeTextEntry2] : All Syed, DO [FreeTextEntry3] : Kane Mooney MD Surgical Oncology [facility] Office: [phone] Cell: [phone]

## 2024-12-10 NOTE — HISTORY OF PRESENT ILLNESS
[de-identified] : Ms. JANETH KOEHLER is a 60-year-old woman here for a follow-up visit.   She underwent bilateral mastectomies in November 2010.  She was seen for initial consultation in December 2017 for evaluation of right axillary lymphadenopathy and a history of left sided breast cancer.   She was referred for a CTA of the chest in August 2017 given complaints of dyspnea, which was negative for PE but revealed right-sided axillary adenopathy measuring up to 1.9 cm.  She was referred for a bilateral breast/chest wall ultrasound in November 2017, which revealed benign appearing nodules in the right breast for which a 6 month follow up exam was recommended, and an unremarkable axillary lymph node with normal fatty hilum (BIRADS 3).  She has also been experiencing abdominal/epigastric pain of unclear etiology.  She has been worked up by GI, Cardiology and neurology with no significant findings.  She had a recent repeat EGD (7/2018) and CT (6/2018) which were both unrevealing.  She will continue to follow up with Dr. Hernandez regarding this.  She was prescribed Hyoscyamine and Baclofen.  She was subsequently referred for a breast MRI which showed right axillary adenopathy (3.4 cm) for which FNA was recommended.  FNA was done on 1/9/18 and was negative for malignant cells and consistent with a reactive lymph node.  As such, we agreed to short term surveillance with a follow up ultrasound.  **SURGERY** After discussing the case with radiology, we decided to proceed with an excisional biopsy.  She is status post excisional biopsy of right axillary lymph node on 3/14/18.  Final pathology was consistent with lymph nodes with follicular hyperplasia and plasmacytosis.  No diagnostic abnormalities were seen on flow cytometry.  US bilateral breasts 9/30/20: No sonographic evidence of malignancy.  Further management of pain should be based clinically, BI-RADS 1.   MR Bilateral Breasts 10/30/20: No MRI evidence of malignancy, BI-RADS 2.   She underwent breast reconstruction revision in December 2020.   CT Abdomen/Pelvis 2/11/21: Tiny portion of small bowel interposed between the rectus musculature and ventral hernia mesh in the left lower quadrant. No hernia identified. No bowel obstruction.  PET/CT Skull to Thigh 3/4/21:  1)Patient is status post interval removal of bilateral breast implants with BRIANNA flap reconstruction, as seen on interval CT scans. In anterior aspect of reconstructed left breast, there is a photopenic density which measures slightly higher than simple fluid and demonstrate minimal, peripheral FDG activity. This increased in size since 10/31/2019, measuring approximately 9.2 x 2.8 cm (SUV 2.3; image 81), as compared to 4.3 x 1.2 cm on 10/31/2019. No suspicious enhancement is seen in reconstructed left breast on MRI from 10/30/2020. This may represent a postoperative seroma. Ultrasound is recommended for further evaluation.  2. Status post interval partial right sternectomy with chronic fracture deformity of mid sternum as seen on interval CT scans. There is a small focus of mild FDG activity associated with the fracture deformity which is unchanged in appearance on interval CT scans. Resolution of infiltrative changes seen in chest wall, surrounding sternal fracture deformity, on interval CT scans. 3. New nonspecific subcentimeter FDG-avid right axillary lymph node. 4. A tiny portion of small bowel is interposed between the rectus musculature and the ventral hernia mesh in the left lower quadrant, as seen on CT dated 2/11/2021.  US Breast Bilateral 3/22/21: Large complicated fluid collection in the reconstructed left upper breast with internal debris and septations, new since prior breast MRI from 10/2020. The differential considerations include inflammation/infection, however malignant recurrence or implant associated lymphoma cannot be excluded. Ultrasound-guided aspiration is recommended. Further management and assessment on clinical grounds is also suggested.  BI-RADS 4A.   BREAST, LEFT UPPER, US GUIDED FNA 4/6/21:  NEGATIVE FOR MALIGNANT CELLS. (Results are BENIGN AND CONCORDANT).  She underwent umbilical revision on 4/16/21.   She was referred for a CTA of the chest on 6/13/21 to evaluate shortness of breath, which was negative for PE but demonstrated a 8 x 1.8 cm left breast collection (previously aspirated).  This has been asymptomatic, however, she briefly discussed surgical intervention for definitive treatment with Dr. Vasquez.  She subsequently underwent a  follow up breast ultrasound on 9/1/21 which revealed an 8.3 x 4.3 x 0.4 cm fluid collection in the upper reconstructed left breast at the scar site, decreased in size from prior study (BIRADS 2).   Her past medical history is otherwise notable for SVT (s/p cardiac ablation 2012- currently on metoprolol), MS, GERD.  Prior surgeries include a cholecystectomy, hysterectomy and umbilical hernia repair.  B/L breast U/S from 11/29/22 showed probable fat necrosis in the right breast at 2:00, 7 cm from the nipple, at the medial portion of the scar site. Follow-up sonogram in 6 months is recommended to ascertain stability. BI-RADS 3 Breast MRI 12/2/22 with benign findings, BI-RADS 2.   12/22/2022- Janeth presents for a follow-up visit, she is most recently s/p revision of her B/L reconstructed breast with exploration of left breast, seroma removal, fat grafting and liposuction in January of 2022 w/ Dr. Vasquez.  PET/CT from 12/2022 shows new hypermetabolic uptake in multiple right axillary nodes, at least 3 new examples. Dr. Syed then sent her back to me to discuss new findings. She is also undergoing multiple GI testing with Dr. Harris and parathyroid work-up with Dr. Cazares. Janeth will undergo as right ultrasound guided biopsy of the axillary lymph nodes. She will follow up with us shortly thereafter.  Spoke with Janeth on 1/7/2023, attempt at US biopsy showed no discreet LN, will order a chest CT to reevaluate.   CT chest from 1/15/2023 showed right subpectoral and supraclavicular lymph nodes are unchanged in size and appearance since 12/2/2022 PET/CT exam. No new findings within the chest  1/26/2023: Janeth returns to discuss her CT scan findings.  She does mention occasional night sweats and fatigue as constitutional symptoms. I will review her CT imaging with radiology.  She may be amenable to a CT-guided biopsy.  We will touch base next week.  s/p CT guided bx of retropectoral LN on 3/10/2023 favors reactive LN.  B/L breast U/S 5/31/2023, BI-RADS 2 --- however there were no comments made on her axilla regions. right axilla U/S 7/14/2023 - no evidence of LAD, previously biopsied right retro pectoral LN not seen   CT chest 8/29/2023 - unchanged right subpectoral & axillary LN   9/21/2023 - Janeth returns for ongoing follow-up, she does still report night sweats but otherwise no remaining B symptoms. No LAD on physical exam and she otherwise is in her usual state of health.   CT chest 2/2024 - new nonspecific 3 mm LLL nodule, compared to 8/2023 - stable sub-4 mm DARRYL nodules, including previously new 2 mm nodule  - stable sub centimeter right axillary/subpectoral LN   3/21/2024 - Janeth returns for ongoing follow-up, she is feeling well but does report ongoing night sweats 3-4 nights a week. She has been dealing with some GI issues/IBS but otherwise denies any new health issues.   PET/CT 4/7/2024: Few small FDG-avid right axillary/retropectoral lymph nodes are mildly increased in size and metabolism, a difficult to delineate right presacral lymph node is increased in metabolism, and several small FDG-avid right inguinal lymph nodes are unchanged in size and increased in metabolism. Findings are nonspecific. Differential diagnosis includes lymphoma. Metastatic breast cancer is unlikely. Further evaluation may be performed with ultrasound-guided percutaneous needle biopsy. Small FDG-avid focus in anterior abdominal wall, just to the left of midline, and slightly above the level of the umbilicus, not significantly changed, possibly related to prior surgery. Please correlate clinically and with ultrasound.  5/13/2024- Janeth returns for a presurgical consultation and to evaluate abdominal wall findings on PET/CT.  Janeth is scheduled for surgery 5/17.    **SURGERY** She is status post right axillary lymph node excision on 5/17/2024.  Pathology revealed follicular and paracortical hyperplasia with NO overt evidence of lymphoma or carcinoma.     Postop course complicated by hematoma for which she underwent wash out/evacuation of right axillary hematoma on 5/20/2024.  Hospital course prolonged due to inability to achieve therapeutic anticoagulation.  6/10/2024: Janeth is feeling well.  Incision is healing appropriately, and she denies any further drainage, no swelling or pain.  Therapeutic on anticoagulation at this point. Janeth is doing well.  She will follow up with us in 6 months.  CT C/A/P 8/26/2024 - no evidence of metastatic disease - post op changes in left breast, similar to prior CT  - no LAD, previously noted hematoma to right axilla no longer seen, surgical clips to B/L axilla   9/19/2024 - Janeth returns for ongoing follow-up, she continues to report persistent night sweats and intermittently during the day. She recently recovered from a bout of GI issues (Sapovirus, E. coli, and C. diff that were identified by GI PCR and treated with ABTs). She is otherwise in her usual state of health and denies any new/worrisome symptoms.  Janeth will follow-up in 1 year, she will continue close follow-up with Dr. Syed and possibly rheumatology.   12/10/2024- Janeth returns for ongoing follow-up, she is feeling well overall, still dealing with some GI issues but actively following w/ GI. She continues to have persistent night sweats but has established care w/ Dr. Zita Segura from [facility] who is doing the full work-up. She otherwise denies any new or worrisome symptoms.

## 2024-12-10 NOTE — CONSULT LETTER
[Dear  ___] : Dear ~PRANEETH, [Consult Letter:] : I had the pleasure of evaluating your patient, [unfilled]. [Please see my note below.] : Please see my note below. [Consult Closing:] : Thank you very much for allowing me to participate in the care of this patient.  If you have any questions, please do not hesitate to contact me. [Sincerely,] : Sincerely, [DrLane  ___] : Dr. BUCKLEY [DrLane ___] : Dr. BUCKLEY [FreeTextEntry2] : All Syed, DO [FreeTextEntry3] : Kane Mooney MD Surgical Oncology [facility] Office: [phone] Cell: [phone]

## 2025-01-16 NOTE — ASSESSMENT
[FreeTextEntry1] : 60-year-old female BMI 46/postcholecystectomy/IBS-D/glaucoma here for follow-up care, with infections last year, now with ongoing diarrhea.?  IBS-D versus other, bile salt diarrhea, rule out EPI.  1.  Stool GI by PCR 2.  Check fecal elastase, alpha-1 antitrypsin, stool osmolality, stool pH, stool lites, fecal calprotectin 2.  Start oil of oregano 2 tabs twice daily 3.  Adhere to a strict low FODMAPs diet, instructional handout provided 4.  Start cholestyramine after stool testing, 3 times a day 5.  Follow with me in 3 months

## 2025-01-16 NOTE — HISTORY OF PRESENT ILLNESS
[FreeTextEntry1] : 60-year-old female RAFFAELE/MS/gastroparesis/BMI 46/With Giardia infection in April Sapovirus/EPEC/EAEC in August/status post VCE with limited views, colonoscopy and endoscopy November 2023 now here for follow-up care.  Patient still feeling poorly, complaining of lots of abdominal bloating/gas/abdominal distention.  She still having postprandial diarrhea sometimes 30 minutes after eating other times immediately or while eating.  She has learned to avoid dairy.  She is also complaining of severe abdominal cramps, this occurs at rest, worsens with walking.  She has not had any weight loss.  In the past, she was on hyoscyamine/dicyclomine which had to be discontinued due to diagnosis of glaucoma.

## 2025-04-10 NOTE — DISCUSSION/SUMMARY
[FreeTextEntry1] : ---Assessment plan----------The patient has been referred here for further opinion regarding pulmonary problem, Patient is a 61 Y.O. female with history of mild intermittent asthma, PE 2019, emphysema on CT, former smoker,  breast Ca s/p chemo/rads/mastectomy , history of PE on Coumadin  who returns for follow up. history of multiple sclerosis, SVT , breast ca s/p chemotherapy and bilateral mastectomy in 2011 and s/p delayed BRIANNA reconstruction of bilateral breasts (wound vac on abdomen for slow healing).    1) psg  -raffaele -noncompliant to cpap-wt loss advised---  refd for john-dental device---to treat RAFFAELE-----may be a candidate for zepbound- will await ins approval  2)h/o  Bilateral PE's- agree with heme for life long a/c. INR checks by heme- yearly echo to evaluate PA pressures---Echo reviewed from April 2022, unchanged--REPEAT ECHO w/Dr MUELLER---NEEDS REEAPT CT in 8/2025------  3)  history of breast cancer--/lymphadenopathy -F/U ONCOLOGY  Q3m 4) needs PFT annually 9/2025 5)cards Dr Mueller -will request echo-report 6) exercise and wt loss discussed- referred to wt management 7) f/u 9/2025   Thanks for allowing  me to participate  in the care of this patient.  Patient at this time  will follow  the above mentioned recommendations and return back for follow up visit. If you have any questions  I can be reached  at #[phone] (office).  Floridalma Delgado MD, [facility]

## 2025-04-10 NOTE — END OF VISIT
[FreeTextEntry3] :   I, Dr. Floridalma Delgado  personally performed the evaluation and management (E/M) services for this established patient who presents today with (a) new problem(s)/exacerbation of (an) existing condition(s). That E/M includes conducting the clinically appropriate interval history &/or exam, assessing all new/exacerbated conditions, and establishing a new plan of care. Today, my PANDA, Lori Hernandez NP, , was here to observe my evaluation and management service for this new problem/exacerbated condition and follow the plan of care established by me going forward. [Time Spent: ___ minutes] : I have spent [unfilled] minutes of time on the encounter which excludes teaching and separately reported services.

## 2025-04-10 NOTE — DISCUSSION/SUMMARY
[FreeTextEntry1] : ---Assessment plan----------The patient has been referred here for further opinion regarding pulmonary problem, Patient is a 61 Y.O. female with history of mild intermittent asthma, PE 2019, emphysema on CT, former smoker,  breast Ca s/p chemo/rads/mastectomy , history of PE on Coumadin  who returns for follow up. history of multiple sclerosis, SVT , breast ca s/p chemotherapy and bilateral mastectomy in 2011 and s/p delayed BRIANNA reconstruction of bilateral breasts (wound vac on abdomen for slow healing).    1) psg  -raffaele -noncompliant to cpap-wt loss advised---  refd for john-dental device---to treat RAFFAELE-----may be a candidate for zepbound- will await ins approval  2)h/o  Bilateral PE's- agree with heme for life long a/c. INR checks by heme- yearly echo to evaluate PA pressures---Echo reviewed from April 2022, unchanged--REPEAT ECHO w/Dr MUELLER---NEEDS REEAPT CT in 8/2025------  3)  history of breast cancer--/lymphadenopathy -F/U ONCOLOGY  Q3m 4) needs PFT annually 9/2025 5)cards Dr Mueller -will request echo-report 6) exercise and wt loss discussed- referred to wt management 7) f/u 9/2025   Thanks for allowing  me to participate  in the care of this patient.  Patient at this time  will follow  the above mentioned recommendations and return back for follow up visit. If you have any questions  I can be reached  at #[phone] (office).  Floridal[facility] Delgado MD, [facility]

## 2025-04-10 NOTE — DISCUSSION/SUMMARY
[FreeTextEntry1] : ---Assessment plan----------The patient has been referred here for further opinion regarding pulmonary problem, Patient is a 61 Y.O. female with history of mild intermittent asthma, PE 2019, emphysema on CT, former smoker,  breast Ca s/p chemo/rads/mastectomy , history of PE on Coumadin  who returns for follow up. history of multiple sclerosis, SVT , breast ca s/p chemotherapy and bilateral mastectomy in 2011 and s/p delayed BRIANNA reconstruction of bilateral breasts (wound vac on abdomen for slow healing).    1) psg  -raffaele -noncompliant to cpap-wt loss advised---  refd for john-dental device---to treat RAFFAELE-----may be a candidate for zepbound- will await ins approval  2)h/o  Bilateral PE's- agree with heme for life long a/c. INR checks by heme- yearly echo to evaluate PA pressures---Echo reviewed from April 2022, unchanged--REPEAT ECHO w/Dr MUELLER---NEEDS REEAPT CT in 8/2025------  3)  history of breast cancer--/lymphadenopathy -F/U ONCOLOGY  Q3m 4) needs PFT annually 9/2025 5)cards Dr Mueller -will request echo-report 6) exercise and wt loss discussed- referred to wt management 7) f/u 9/2025   Thanks for allowing  me to participate  in the care of this patient.  Patient at this time  will follow  the above mentioned recommendations and return back for follow up visit. If you have any questions  I can be reached  at #[phone] (office).  Floridalma Delgado MD, [facility]

## 2025-04-10 NOTE — HISTORY OF PRESENT ILLNESS
[Never] : never [TextBox_4] : This letter  is regarding your patient  who  attended pulmonary out patient office today.  I have reviewed  patient's  past history, social history, family history and medication list. I also  reviewed nurse practitioners/ and fellows  notes and assessment and agree with it.   The patient was referred by Dr. Johnson  59 y/o woman who complained of back pain in July 2019. Admitted to [facility] for PE. Sent home on Lovenox. She has a history of breast cancer. She has a partial sternectomy on July 12, 2019. There is no history of a prior DVT or PE. No know family history of DVT or PE. She is S/P bilateral mastectomy. history of multiple sclerosis, SVT , breast ca s/p chemotherapy and bilateral mastectomy in 2011 and s/p delayed BRIANNA reconstruction of bilateral breasts (wound vac on abdomen for slow healing).   She had some shortness of breath in Octobe 2023- r. A CT angiogram was negative for PE. She is now on warfarin as per her hematologist/oncologist.     ------No history of , fever, chills , rigors, chest pain, or hemoptysis. Questionable history of Raynaud's phenomenon. No h/o significant weight loss in last few months. No history of liver dysfunction , collagen vascular disorder or chronic thromboembolic disease. I would classify the patient's dyspnea as WHO  FUNCTIONAL CLASS II--------  ----Echo  date------2021----ef  57%, normal rv NM Bone scan on 4/29/19 reveals a focus of increased tracer uptake in the lateral aspect of the right sternum corresponding to sclerosis seen on CT.  PSG   2020  --MILD  -RAFFAELE  --AHI 7.1  PFT 2020-------NORMAL  CT chest scan 4/6/19 reveals new area of sclerosis and cortical erosion in the right lateral aspect of the sternum.    -CT Chest 10/31/19: No pulmonary emboli were seen. No pleural effusion. No lymphadenopathy. Emphysema suspected.   cta chest 6/2021 neg for PE   CT CHEST 1/2023 IMPRESSION: Right subpectoral and supraclavicular lymph nodes are unchanged in size and appearance since 12/2/2022 PET/CT exam. No new findings within the chest   PET/CT 12/2022 IMPRESSION: Since prior FDG-PET/CT scan 3/6/2021: 1. Hypermetabolic uptake in multiple RIGHT axillary nodes, including a previously seen nonspecific node, as well as at least 3 NEW examples. Malignant involvement is not excluded. 2. Further involution of previously seen LEFT breast seroma with associated mild uptake. No NEW suspicious findings otherwise seen in that region. 3. Stable morphologic appearance of previously seen postsurgical changes at the sternum, with mildly decreased associated activity. 4. Previously seen tiny loop of small bowel NO longer seems to be interposed between the rectus musculature and LEFT LOWER quadrant ventral herniorrhaphy mesh   CT CHEST IC - ORDERED BY: GUME FLORENCE PROCEDURE DATE: 02/15/2024 COMPARISON: Chest CT 8/29/2023 FINDINGS: Lungs/Airways/Pleura: The central airways are patent. No pleural effusion. Mild apical paraseptal emphysema. Stable 3 mm left upper lobe nodule on series 2, image 37 and previously new 2 mm left upper lobe nodule on image 52. There is a new 3 mm left lower lobe nodule on image 113. Mediastinum/Lymph nodes: Stable subcentimeter right axillary and subpectoral lymph nodes. No new/enlarging thoracic adenopathy. Left axillary karla dissection. Heart and Vessels: The great vessels are normal in size. The heart is normal in size. No pericardial effusion. Upper Abdomen: Cholecystectomy. Unchanged mild nodularity along the anterior abdominal wall may be postoperative. Osseous structures and Soft Tissues: No aggressive bone lesions. Old sternal fracture. Bilateral mastectomies with flap reconstruction. Stable small left breast seroma with asymmetric overlying skin thickening. IMPRESSION: New nonspecific 3 mm left lower lobe nodule compared to August 2023. Attention on follow-up imaging. Stable sub-4 mm left upper lobe nodules, including the previously new 2 mm nodule. Stable subcentimeter right axillary/subpectoral lymph nodes.    PFT 11/20/19: Spirometry was normal. Lung volumes were normal. There was a mild reduction in diffusion. No change after bronchodilator.    sternal lesion found on imaging  by Dr. Syed. Patient underwent sternal biopsy on 5/23/19 which was non diagnostic s/p resection of lesion 2019 by Dr Cooper Results  [id number] 02:28PM  Pathology  [facility]: Jose Accession Number : [id number] Surgical Final Report Final Diagnosis 1. Bone, sternum, coil, extraction - Coiled portions of metal (gross only)  2. Bone, sternum, right partial sternectomy - Bone tissue with histiocytic proliferation and giant cells, (cytokeratin stain AE1:AE3 is negative (2B,2C,2D,2E)  8/11/2020 Patient feeling well, JOHNSON with 3 blocks, no wheezing, last MS flare 3 years ago, last asthma flare was 2 years ago. Does not have symptoms at baseline. + snoring with BMI of 44  8/2020 PFT- Normal delon, lung volume and DLCO, decreased ERV.   feb 2021-----feels better awaiting CPAP, on Coumadin INR being maintained by her PMD needs an echocardiogram-----Dr. cooper has ordered a CT PET to evaluate her sternum history of breast cancer  may 2021=====  -feels better on  CPAP, --------- will obtain compliance report  ----------------on Coumadin INR being maintained by her PMD needs an echocardiogram-----Dr. cooper has ordered a CT PET to evaluate her sternum history of breast cancer-----   9/2021 raffaele- stopped using cpap d/t recall s/p hernia repair- tolerated surgery well- no problems. Has upcoming colonoscopy at [facility] next week no current resp complaints. on lifelong coumadin d/t pMh PE she is up to date w/covid and influenza vac   11/2021 raffaele on cpap and benefits from its nighty use no current resp complaints. on lifelong coumadin d/t pMh PE she is up to date w/covid and influenza vac   1/2022 raffaele on cpap and benefits from its nighty use- s/p mask fitting- awaiting replacement of recalled device Occasional JOHNSON. h/o PE on coumadin- dosed by heme s/p recent breast surgery for cyst of breast she is up to date w/covid and influenza vac  may 2022  -------    c/o pain abdomen-------? pancreatitis----- awaiting ultrasound of the abdomen--using CPAP-----  August 18 2022 -------raffaele cpap compliant some nights, she is worried about the recall---s/o mask fitting---awating replacement of recalled device denies JOHNSON, chest pain, palpation- h/o of PE- on Coumadin- followed by PCP  s/p b/l mastectomy, follows with plastic surgery abdominal pain is better----possible IBS, following with GI and hepatology She denies shortness of breath, cough, and fever   2/13/2023 being worked up for ? recurrence of breast cancer- o/s CT guided biopsy of right breast lesion RAFFAELE- not compliant with cpap use No present resp complaints  CT CHEST 8/2023 IMPRESSION:  Unchanged small right subpectoral and axillary lymph nodes.  Punctate left upper lobe nodule, not definitely on prior's although may have been obscured by dependent atelectasis. Recommend follow-up on subsequent surveillance imaging.   1/2024 s/p parathyroid surgery 2023--- stable from pulm perspective- reports mild JOHNSON which she attributes to being overweight/deconditioned reports breast workup was negative for breast ca- o/s repeat breast CT OSA_ non complaint to cpap Follows cards Dr Mueller who does annual echos bipedal edema- on lasix daily   CT CHEST 2/2024 IMPRESSION: New nonspecific 3 mm left lower lobe nodule compared to August 2023.  Attention on follow-up imaging.  Stable sub-4 mm left upper lobe nodules, including the previously new 2  mm nodule.  Stable subcentimeter right axillary/subpectoral lymph nodes.  PET/CT 4/2024 IMPRESSION: Compared to FDG-PET/CT scan dated 12/2/2022:  1. Few small FDG-avid right axillary/retropectoral lymph nodes are mildly increased in size and metabolism, a difficult to delineate right presacral lymph node is increased in metabolism, and several small FDG-avid right inguinal lymph nodes are unchanged in size and increased in metabolism. Findings are nonspecific. Differential diagnosis includes lymphoma. Metastatic breast cancer is unlikely. Further evaluation may be performed with ultrasound-guided percutaneous needle biopsy.  2. Small FDG-avid focus in anterior abdominal wall, just to the left of midline, and slightly above the level of the umbilicus, not significantly changed, possibly related to prior surgery. Please correlate clinically and with ultrasound.  4/2024 no pulm issues- here to review PET/CT OSA_ non compliant to cpap MS- follows neuro Cards Dr Mueller o/s echo- -- being considered for lymph node biopsy-----no contraindication to the planned procedure from pulmonary point of view-she should bring her CPAP machine on the day of surgery so that it can be used in the postop.  If necessary   4/2025 Obesity/dyastolic dysfunction OSA_ non compliant to cpap- willing to pursue oral dental device MS- follows neuro Cards Dr Mueller- gets annual echo Elevated LFTs -follows Dr Wolf history of breast cancer--/lymphadenopathy -F/U ONCOLOGY  Q3m Follows GI- longstanding GI issues- had recent scope- on PPI [TextBox_19] : NO RESIDUAL EDS  --ESS WAS  3 ON FEB 2023 [ESS] : 3

## 2025-04-10 NOTE — HISTORY OF PRESENT ILLNESS
[Never] : never [TextBox_4] : This letter  is regarding your patient  who  attended pulmonary out patient office today.  I have reviewed  patient's  past history, social history, family history and medication list. I also  reviewed nurse practitioners/ and fellows  notes and assessment and agree with it.   The patient was referred by Dr. Johnson  61 y/o woman who complained of back pain in July 2019. Admitted to [facility] for PE. Sent home on Lovenox. She has a history of breast cancer. She has a partial sternectomy on July 12, 2019. There is no history of a prior DVT or PE. No know family history of DVT or PE. She is S/P bilateral mastectomy. history of multiple sclerosis, SVT , breast ca s/p chemotherapy and bilateral mastectomy in 2011 and s/p delayed BRIANNA reconstruction of bilateral breasts (wound vac on abdomen for slow healing).   She had some shortness of breath in Octobe 2023- r. A CT angiogram was negative for PE. She is now on warfarin as per her hematologist/oncologist.     ------No history of , fever, chills , rigors, chest pain, or hemoptysis. Questionable history of Raynaud's phenomenon. No h/o significant weight loss in last few months. No history of liver dysfunction , collagen vascular disorder or chronic thromboembolic disease. I would classify the patient's dyspnea as WHO  FUNCTIONAL CLASS II--------  ----Echo  date------2021----ef  57%, normal rv NM Bone scan on 4/29/19 reveals a focus of increased tracer uptake in the lateral aspect of the right sternum corresponding to sclerosis seen on CT.  PSG   2020  --MILD  -RAFFAELE  --AHI 7.1  PFT 2020-------NORMAL  CT chest scan 4/6/19 reveals new area of sclerosis and cortical erosion in the right lateral aspect of the sternum.    -CT Chest 10/31/19: No pulmonary emboli were seen. No pleural effusion. No lymphadenopathy. Emphysema suspected.   cta chest 6/2021 neg for PE   CT CHEST 1/2023 IMPRESSION: Right subpectoral and supraclavicular lymph nodes are unchanged in size and appearance since 12/2/2022 PET/CT exam. No new findings within the chest   PET/CT 12/2022 IMPRESSION: Since prior FDG-PET/CT scan 3/6/2021: 1. Hypermetabolic uptake in multiple RIGHT axillary nodes, including a previously seen nonspecific node, as well as at least 3 NEW examples. Malignant involvement is not excluded. 2. Further involution of previously seen LEFT breast seroma with associated mild uptake. No NEW suspicious findings otherwise seen in that region. 3. Stable morphologic appearance of previously seen postsurgical changes at the sternum, with mildly decreased associated activity. 4. Previously seen tiny loop of small bowel NO longer seems to be interposed between the rectus musculature and LEFT LOWER quadrant ventral herniorrhaphy mesh   CT CHEST IC - ORDERED BY: GUME FLORENCE PROCEDURE DATE: 02/15/2024 COMPARISON: Chest CT 8/29/2023 FINDINGS: Lungs/Airways/Pleura: The central airways are patent. No pleural effusion. Mild apical paraseptal emphysema. Stable 3 mm left upper lobe nodule on series 2, image 37 and previously new 2 mm left upper lobe nodule on image 52. There is a new 3 mm left lower lobe nodule on image 113. Mediastinum/Lymph nodes: Stable subcentimeter right axillary and subpectoral lymph nodes. No new/enlarging thoracic adenopathy. Left axillary karla dissection. Heart and Vessels: The great vessels are normal in size. The heart is normal in size. No pericardial effusion. Upper Abdomen: Cholecystectomy. Unchanged mild nodularity along the anterior abdominal wall may be postoperative. Osseous structures and Soft Tissues: No aggressive bone lesions. Old sternal fracture. Bilateral mastectomies with flap reconstruction. Stable small left breast seroma with asymmetric overlying skin thickening. IMPRESSION: New nonspecific 3 mm left lower lobe nodule compared to August 2023. Attention on follow-up imaging. Stable sub-4 mm left upper lobe nodules, including the previously new 2 mm nodule. Stable subcentimeter right axillary/subpectoral lymph nodes.    PFT 11/20/19: Spirometry was normal. Lung volumes were normal. There was a mild reduction in diffusion. No change after bronchodilator.    sternal lesion found on imaging  by Dr. Syed. Patient underwent sternal biopsy on 5/23/19 which was non diagnostic s/p resection of lesion 2019 by Dr Cooper Results  [id number] 02:28PM  Pathology  [facility]: Jose Accession Number : [id number] Surgical Final Report Final Diagnosis 1. Bone, sternum, coil, extraction - Coiled portions of metal (gross only)  2. Bone, sternum, right partial sternectomy - Bone tissue with histiocytic proliferation and giant cells, (cytokeratin stain AE1:AE3 is negative (2B,2C,2D,2E)  8/11/2020 Patient feeling well, JOHNSON with 3 blocks, no wheezing, last MS flare 3 years ago, last asthma flare was 2 years ago. Does not have symptoms at baseline. + snoring with BMI of 44  8/2020 PFT- Normal delon, lung volume and DLCO, decreased ERV.   feb 2021-----feels better awaiting CPAP, on Coumadin INR being maintained by her PMD needs an echocardiogram-----Dr. cooper has ordered a CT PET to evaluate her sternum history of breast cancer  may 2021=====  -feels better on  CPAP, --------- will obtain compliance report  ----------------on Coumadin INR being maintained by her PMD needs an echocardiogram-----Dr. cooper has ordered a CT PET to evaluate her sternum history of breast cancer-----   9/2021 raffaele- stopped using cpap d/t recall s/p hernia repair- tolerated surgery well- no problems. Has upcoming colonoscopy at [facility] next week no current resp complaints. on lifelong coumadin d/t pMh PE she is up to date w/covid and influenza vac   11/2021 raffaele on cpap and benefits from its nighty use no current resp complaints. on lifelong coumadin d/t pMh PE she is up to date w/covid and influenza vac   1/2022 raffaele on cpap and benefits from its nighty use- s/p mask fitting- awaiting replacement of recalled device Occasional JOHNSON. h/o PE on coumadin- dosed by heme s/p recent breast surgery for cyst of breast she is up to date w/covid and influenza vac  may 2022  -------    c/o pain abdomen-------? pancreatitis----- awaiting ultrasound of the abdomen--using CPAP-----  August 18 2022 -------raffaele cpap compliant some nights, she is worried about the recall---s/o mask fitting---awating replacement of recalled device denies JOHNSON, chest pain, palpation- h/o of PE- on Coumadin- followed by PCP  s/p b/l mastectomy, follows with plastic surgery abdominal pain is better----possible IBS, following with GI and hepatology She denies shortness of breath, cough, and fever   2/13/2023 being worked up for ? recurrence of breast cancer- o/s CT guided biopsy of right breast lesion RAFFAELE- not compliant with cpap use No present resp complaints  CT CHEST 8/2023 IMPRESSION:  Unchanged small right subpectoral and axillary lymph nodes.  Punctate left upper lobe nodule, not definitely on prior's although may have been obscured by dependent atelectasis. Recommend follow-up on subsequent surveillance imaging.   1/2024 s/p parathyroid surgery 2023--- stable from pulm perspective- reports mild JOHNSON which she attributes to being overweight/deconditioned reports breast workup was negative for breast ca- o/s repeat breast CT OSA_ non complaint to cpap Follows cards Dr Mueller who does annual echos bipedal edema- on lasix daily   CT CHEST 2/2024 IMPRESSION: New nonspecific 3 mm left lower lobe nodule compared to August 2023.  Attention on follow-up imaging.  Stable sub-4 mm left upper lobe nodules, including the previously new 2  mm nodule.  Stable subcentimeter right axillary/subpectoral lymph nodes.  PET/CT 4/2024 IMPRESSION: Compared to FDG-PET/CT scan dated 12/2/2022:  1. Few small FDG-avid right axillary/retropectoral lymph nodes are mildly increased in size and metabolism, a difficult to delineate right presacral lymph node is increased in metabolism, and several small FDG-avid right inguinal lymph nodes are unchanged in size and increased in metabolism. Findings are nonspecific. Differential diagnosis includes lymphoma. Metastatic breast cancer is unlikely. Further evaluation may be performed with ultrasound-guided percutaneous needle biopsy.  2. Small FDG-avid focus in anterior abdominal wall, just to the left of midline, and slightly above the level of the umbilicus, not significantly changed, possibly related to prior surgery. Please correlate clinically and with ultrasound.  4/2024 no pulm issues- here to review PET/CT OSA_ non compliant to cpap MS- follows neuro Cards Dr Mueller o/s echo- -- being considered for lymph node biopsy-----no contraindication to the planned procedure from pulmonary point of view-she should bring her CPAP machine on the day of surgery so that it can be used in the postop.  If necessary   4/2025 Obesity/dyastolic dysfunction OSA_ non compliant to cpap- willing to pursue oral dental device MS- follows neuro Cards Dr Mueller- gets annual echo Elevated LFTs -follows Dr Wolf history of breast cancer--/lymphadenopathy -F/U ONCOLOGY  Q3m Follows GI- longstanding GI issues- had recent scope- on PPI [TextBox_19] : NO RESIDUAL EDS  --ESS WAS  3 ON FEB 2023 [ESS] : 3

## 2025-04-10 NOTE — HISTORY OF PRESENT ILLNESS
[Never] : never [TextBox_4] : This letter  is regarding your patient  who  attended pulmonary out patient office today.  I have reviewed  patient's  past history, social history, family history and medication list. I also  reviewed nurse practitioners/ and fellows  notes and assessment and agree with it.   The patient was referred by Dr. Johnson  59 y/o woman who complained of back pain in July 2019. Admitted to [facility] for PE. Sent home on Lovenox. She has a history of breast cancer. She has a partial sternectomy on July 12, 2019. There is no history of a prior DVT or PE. No know family history of DVT or PE. She is S/P bilateral mastectomy. history of multiple sclerosis, SVT , breast ca s/p chemotherapy and bilateral mastectomy in 2011 and s/p delayed BRIANNA reconstruction of bilateral breasts (wound vac on abdomen for slow healing).   She had some shortness of breath in Octobe 2023- r. A CT angiogram was negative for PE. She is now on warfarin as per her hematologist/oncologist.     ------No history of , fever, chills , rigors, chest pain, or hemoptysis. Questionable history of Raynaud's phenomenon. No h/o significant weight loss in last few months. No history of liver dysfunction , collagen vascular disorder or chronic thromboembolic disease. I would classify the patient's dyspnea as WHO  FUNCTIONAL CLASS II--------  ----Echo  date------2021----ef  57%, normal rv NM Bone scan on 4/29/19 reveals a focus of increased tracer uptake in the lateral aspect of the right sternum corresponding to sclerosis seen on CT.  PSG   2020  --MILD  -RAFFAELE  --AHI 7.1  PFT 2020-------NORMAL  CT chest scan 4/6/19 reveals new area of sclerosis and cortical erosion in the right lateral aspect of the sternum.    -CT Chest 10/31/19: No pulmonary emboli were seen. No pleural effusion. No lymphadenopathy. Emphysema suspected.   cta chest 6/2021 neg for PE   CT CHEST 1/2023 IMPRESSION: Right subpectoral and supraclavicular lymph nodes are unchanged in size and appearance since 12/2/2022 PET/CT exam. No new findings within the chest   PET/CT 12/2022 IMPRESSION: Since prior FDG-PET/CT scan 3/6/2021: 1. Hypermetabolic uptake in multiple RIGHT axillary nodes, including a previously seen nonspecific node, as well as at least 3 NEW examples. Malignant involvement is not excluded. 2. Further involution of previously seen LEFT breast seroma with associated mild uptake. No NEW suspicious findings otherwise seen in that region. 3. Stable morphologic appearance of previously seen postsurgical changes at the sternum, with mildly decreased associated activity. 4. Previously seen tiny loop of small bowel NO longer seems to be interposed between the rectus musculature and LEFT LOWER quadrant ventral herniorrhaphy mesh   CT CHEST IC - ORDERED BY: GUME FLORENCE PROCEDURE DATE: 02/15/2024 COMPARISON: Chest CT 8/29/2023 FINDINGS: Lungs/Airways/Pleura: The central airways are patent. No pleural effusion. Mild apical paraseptal emphysema. Stable 3 mm left upper lobe nodule on series 2, image 37 and previously new 2 mm left upper lobe nodule on image 52. There is a new 3 mm left lower lobe nodule on image 113. Mediastinum/Lymph nodes: Stable subcentimeter right axillary and subpectoral lymph nodes. No new/enlarging thoracic adenopathy. Left axillary karla dissection. Heart and Vessels: The great vessels are normal in size. The heart is normal in size. No pericardial effusion. Upper Abdomen: Cholecystectomy. Unchanged mild nodularity along the anterior abdominal wall may be postoperative. Osseous structures and Soft Tissues: No aggressive bone lesions. Old sternal fracture. Bilateral mastectomies with flap reconstruction. Stable small left breast seroma with asymmetric overlying skin thickening. IMPRESSION: New nonspecific 3 mm left lower lobe nodule compared to August 2023. Attention on follow-up imaging. Stable sub-4 mm left upper lobe nodules, including the previously new 2 mm nodule. Stable subcentimeter right axillary/subpectoral lymph nodes.    PFT 11/20/19: Spirometry was normal. Lung volumes were normal. There was a mild reduction in diffusion. No change after bronchodilator.    sternal lesion found on imaging  by Dr. Syed. Patient underwent sternal biopsy on 5/23/19 which was non diagnostic s/p resection of lesion 2019 by Dr Cooper Results  [id number] 02:28PM  Pathology  [facility]: Jose Accession Number : [id number] Surgical Final Report Final Diagnosis 1. Bone, sternum, coil, extraction - Coiled portions of metal (gross only)  2. Bone, sternum, right partial sternectomy - Bone tissue with histiocytic proliferation and giant cells, (cytokeratin stain AE1:AE3 is negative (2B,2C,2D,2E)  8/11/2020 Patient feeling well, JOHNSON with 3 blocks, no wheezing, last MS flare 3 years ago, last asthma flare was 2 years ago. Does not have symptoms at baseline. + snoring with BMI of 44  8/2020 PFT- Normal delon, lung volume and DLCO, decreased ERV.   feb 2021-----feels better awaiting CPAP, on Coumadin INR being maintained by her PMD needs an echocardiogram-----Dr. cooper has ordered a CT PET to evaluate her sternum history of breast cancer  may 2021=====  -feels better on  CPAP, --------- will obtain compliance report  ----------------on Coumadin INR being maintained by her PMD needs an echocardiogram-----Dr. cooper has ordered a CT PET to evaluate her sternum history of breast cancer-----   9/2021 raffaele- stopped using cpap d/t recall s/p hernia repair- tolerated surgery well- no problems. Has upcoming colonoscopy at [facility] next week no current resp complaints. on lifelong coumadin d/t pMh PE she is up to date w/covid and influenza vac   11/2021 raffaele on cpap and benefits from its nighty use no current resp complaints. on lifelong coumadin d/t pMh PE she is up to date w/covid and influenza vac   1/2022 raffaele on cpap and benefits from its nighty use- s/p mask fitting- awaiting replacement of recalled device Occasional JOHNSON. h/o PE on coumadin- dosed by heme s/p recent breast surgery for cyst of breast she is up to date w/covid and influenza vac  may 2022  -------    c/o pain abdomen-------? pancreatitis----- awaiting ultrasound of the abdomen--using CPAP-----  August 18 2022 -------raffaele cpap compliant some nights, she is worried about the recall---s/o mask fitting---awating replacement of recalled device denies JOHNSON, chest pain, palpation- h/o of PE- on Coumadin- followed by PCP  s/p b/l mastectomy, follows with plastic surgery abdominal pain is better----possible IBS, following with GI and hepatology She denies shortness of breath, cough, and fever   2/13/2023 being worked up for ? recurrence of breast cancer- o/s CT guided biopsy of right breast lesion RAFFAELE- not compliant with cpap use No present resp complaints  CT CHEST 8/2023 IMPRESSION:  Unchanged small right subpectoral and axillary lymph nodes.  Punctate left upper lobe nodule, not definitely on prior's although may have been obscured by dependent atelectasis. Recommend follow-up on subsequent surveillance imaging.   1/2024 s/p parathyroid surgery 2023--- stable from pulm perspective- reports mild JOHNSON which she attributes to being overweight/deconditioned reports breast workup was negative for breast ca- o/s repeat breast CT OSA_ non complaint to cpap Follows cards Dr Mueller who does annual echos bipedal edema- on lasix daily   CT CHEST 2/2024 IMPRESSION: New nonspecific 3 mm left lower lobe nodule compared to August 2023.  Attention on follow-up imaging.  Stable sub-4 mm left upper lobe nodules, including the previously new 2  mm nodule.  Stable subcentimeter right axillary/subpectoral lymph nodes.  PET/CT 4/2024 IMPRESSION: Compared to FDG-PET/CT scan dated 12/2/2022:  1. Few small FDG-avid right axillary/retropectoral lymph nodes are mildly increased in size and metabolism, a difficult to delineate right presacral lymph node is increased in metabolism, and several small FDG-avid right inguinal lymph nodes are unchanged in size and increased in metabolism. Findings are nonspecific. Differential diagnosis includes lymphoma. Metastatic breast cancer is unlikely. Further evaluation may be performed with ultrasound-guided percutaneous needle biopsy.  2. Small FDG-avid focus in anterior abdominal wall, just to the left of midline, and slightly above the level of the umbilicus, not significantly changed, possibly related to prior surgery. Please correlate clinically and with ultrasound.  4/2024 no pulm issues- here to review PET/CT OSA_ non compliant to cpap MS- follows neuro Cards Dr Mueller o/s echo- -- being considered for lymph node biopsy-----no contraindication to the planned procedure from pulmonary point of view-she should bring her CPAP machine on the day of surgery so that it can be used in the postop.  If necessary   4/2025 Obesity/dyastolic dysfunction OSA_ non compliant to cpap- willing to pursue oral dental device MS- follows neuro Cards Dr Mueller- gets annual echo Elevated LFTs -follows Dr Wolf history of breast cancer--/lymphadenopathy -F/U ONCOLOGY  Q3m Follows GI- longstanding GI issues- had recent scope- on PPI [TextBox_19] : NO RESIDUAL EDS  --ESS WAS  3 ON FEB 2023 [ESS] : 3

## 2025-04-21 NOTE — HISTORY OF PRESENT ILLNESS
[FreeTextEntry1] : 61-year-old female with RAFFAELE/MS/history of gastroparesis with normal smart pill 2021/SIBO virus/EAEC/EPEC h/norovirus/Giardia in 2024 and January 2025 here for follow-up care.  Repeat GI PCR panel in February was negative.  She is status post colonoscopy with biopsies which demonstrated lymphoid aggregates but negative for colitis.  Patient was on dicyclomine for IBS-D, which improved some of her abdominal pain symptoms but had to be discontinued due to diagnosis of glaucoma.  Since that time, she has had a recurrence of severe abdominal pain, sometimes periumbilical, sometimes radiating to her back and epigastric area which is relieved by having a bowel movement.  Patient states that she is still having loose stools up to 3 times a day sometimes 6 but always notices some hard pellets within watery diarrhea.  On her last visit she was supposed to start oil of oregano 2 tabs daily but she has not been able to obtain this despite visiting multiple drugstores.  Her repeat stool studies are negative for EPI/protein-losing enteropathy.

## 2025-04-21 NOTE — ASSESSMENT
[FreeTextEntry1] : 61-year-old female with significant abdominal pain/multiple episodes of loose stools a day here for follow-up care.  Has a known diagnosis of gastroparesis though studies in 2021 were negative for gastroparesis.?  Overflow diarrhea versus IBS-D.  I asked her to repeat a nuclear medicine gastric emptying scan with colon follow-through to evaluate whether or not this is overflow diarrhea.

## 2025-05-06 NOTE — DATA REVIEWED
[FreeTextEntry1] : labs, imaging, consultant, and external records reviewed with patient today, external records to be scanned into chart  external labs: lyme negative, ACE WNL, uric 5.3, quantiferon negative, IgA 416 elevated, IgG and IgM WNL, ESR 14, CRP 6.4 (negative), CCP negative, CBC no cytopenia, CMP fine, CPK WNL, B2G and anticardiolipin negative additional labs: ESR 22, CRP 9, RF negative, SPEP WNL, IF no monoclonal band, C3 and C4 elevated, JOSE ANGEL negative prior was 1:320 speckled, negative dsDNA, MADYSON, SSA/SSB, and ANCA  CT chest/abdomen/pelvis imaging 8/2024 no lymphadenopathy PET CT 4/2024   1. Few small FDG-avid right axillary/retropectoral lymph nodes are mildly increased in size and metabolism, a difficult to delineate right presacral lymph node is increased in metabolism, and several small FDG-avid right inguinal lymph nodes are unchanged in size and increased in metabolism. Findings are nonspecific. Differential diagnosis includes lymphoma. Metastatic breast cancer is unlikely. Further evaluation may be performed with ultrasound-guided percutaneous needle biopsy. 2. Small FDG-avid focus in anterior abdominal wall, just to the left of midline, and slightly above the level of the umbilicus, not significantly changed, possibly related to prior surgery. Please correlate clinically and with ultrasound. R axillary LN biopsy 5/2024 Specimen(s) Submitted 1-Right axillay lymph node  Final Diagnosis 1. Lymph node, right axillary: - Follicular and paracortical hyperplasia - No evidence of carcinoma See note and description.  Diagnostic note: Current biopsy of the right axilla lymph node shows follicular and paracortical hyperplasia. There is no overt evidence of lymphoma or carcinoma.  Microscopic description: Histologic  sections of the right  axilla lymph node shows reactive follicles and expanded   paracortical regions composed of polymorphous population of small lymphocytes,   histiocytes , plasma cells, immunoblasts.  Immunohistochemistry :   Immunohistochemical  stains for CD3, CD5, CD20, PAX5, CD10, BCL6, BCL2, CyclinD1, Ki67, CD23, CD21, CD43, CD79a, MUM1, MNDA, LEF1,in  situ hybridization stains kappa and lambda performed on formalin fixed paraffin embedded tissue, block 1A.  CD20, PAX5, CD79a highlight B-cells in the follicles and few B-cells in the  interfollicular  regions. CD10, BCL6 highlight the germinal centers that are appropriately BCL2 negative.    Cyclin D1 stains scattered  histiocytes . CD21/CD23 highlight  follicular dendritic  meshwork. CD3, CD5, CD43, LEF1 highlight inter and    intrafollicular T-cells. MUM1 stains increased inter and intrafollicular    polytypic (kappa and lambda) plasma cells. MNDA highlight scattered   histiocytes  and few B-cells outside the germinal centers.   Ki-67 demonstrates the proliferating germinal centers and   demonstrates a low proliferative  rate of less than 10% in the  interfollicular  regions.  Flow cytometry analysis ([id number]):  Right axilla lymph node, for flow cytometry: No diagnostic immunophenotypic evidence of non-Hodgkin lymphoma.  MRI C/T spine 8/2024 There is no significant change in focus of T2 signal hyperintensity within the left hemicord at the T3/T4 level, in keeping with sequelae of chronic demyelination. There is no additional, new or enhancing lesion within the cervical or thoracic spine. Minimal cervical and thoracic spondylosis, most pronounced at T3-T4 where there is mild right neural foraminal narrowing. No additional areas of significant central canal or neural foraminal narrowing within the cervical or thoracic spine.

## 2025-05-06 NOTE — ASSESSMENT
[FreeTextEntry1] : This is a 61yf with MHx breast cancer s/p b/l mastectomy/chemo/radiation, PE on coumadin, RAFFAELE, multiple sclerosis, and gastroparesis who presents for evaluation of lymphadenopathy.   #Lymphadenopathy  -Reports on and off lymphadenopathy since breast cancer dx in 2010, s/p multiple non diagnostic biopsies. Reports LAD in b/l axilla and groin region, comes and goes, painful, last episode 2 weeks ago  -Biopsy R axilla 5/2024 follicular and paracortical hyperplasia. There is no overt evidence of lymphoma. Histology with polymorphous population of small lymphocytes, histiocytes , plasma cells, immunoblasts  -Prior biopsy breast 2022 Fragments of benign fibrous tissue with focal chronic inflammation and giant cells, and reactive change -Umbilicus soft tissue biopsy 4/2021 ruptured epidermal inclusion cyst with chronic lymphoplasmacytic inflammation and fibrosis -CT C/A/P 8/2024 no lymphadenopathy  -Labs with CBC no cytopenia, negative JOSE ANGEL and sub serologies, ESR 22 WNL (age and gender adjusted), CRP 9, ACE WNL, ANCA negative, IgG WNL, RF/CCP negative  -Rheumatologic considerations include but are not limited to SLE, sarcoidosis, IgG4 disease, Sjogren's, and RA but pt lacks other clinical features of those diseases and serologies have been negative. Langerhans cell histiocytosis is another rare consideration but pt lacks other supporting features  -Will f/u workup below and discussed need to obtain repeat imaging and additional biopsy if possible   #Abdominal pain -Undergoing thorough GI workup, with unrevealing EGD/colonoscopy [] Will f/u gastric emptying study happening this week  [] Planned for repeat CT chest August 2025, will obtain abdominal imaging as well, ordered today  -Will f/u above workup to assess for need for repeat biopsy and additional staining, will also discuss with GI   #Paresthesias  -No stigmata of SLE or ANCA vasculitis, with negative serologies  -Reports hand and finger numbness/tingling for 2 months. Has chronic paresthesia of b/l feet s/p chemo  -Following with Neurology, planned for repeat MR imaging for MS August 2025 [] Will f/u EMG scheduled 5/21 -Consider sural n. biopsy if EMG is unrevealing  -Will discuss with Neuro   Will follow up results above and coordinate f/u accordingly   Discussed with Dr. Joe Rosado MD  Rheumatology Fellow

## 2025-05-06 NOTE — HISTORY OF PRESENT ILLNESS
[FreeTextEntry1] : This is a 61yf with MHx breast cancer s/p b/l mastectomy/chemo/radiation, PE on coumadin, RAFFAELE, multiple sclerosis, and gastroparesis who presents for evaluation of lymphadenopathy.   -Referred by surgical oncology for lymphadenopathy evaluation  -Saw rheumatology Dr. Zita Segura who sent her for second opinion  -Saw Neurologist who noted elevated markers of inflammation and also recommended rheum follow up   -Reports on and off lymphadenopathy since breast cancer dx in 2010, s/p multiple non diagnostic biopsies. Reports LAD in b/l axilla and groin region, comes and goes, painful, last episode 2 weeks ago  -Reports hand and finger pain for the last 2 months, mostly with numbness and tingling. Has chronic paresthesia of b/l feet s/p chemo  -Occasional R shoulder pain  -Ankle swelling and occasional hand swelling, hand warmth, no joint erythema, AM stiffness lasting 5-10 minutes -No fevers, chills, unintentional weight loss, ulcers, alopecia, sicca symptoms, visual changes, Raynaud's, chest pain, cough, SOB, recent travels, epistaxis or hx sinus infections, rashes, urinary changes  -Night sweats for over a year  -Abdominal cramps all the time, diarrhea, undergoing extensive GI workup, s/p unrevealing EGD/colonoscopy, undergoing gastric emptying study this week  -2 early miscarriages, 2 children no complications, no pre-eclampsia, hx PE (2019) s/p chest surgery on coumadin due to weight  -Family hx of RA in aunt -Meds: warfarin, gabapentin, folic acid, metoprolol, vitamins, amlodipine, atorvastatin, omeprazole  -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

## 2025-05-06 NOTE — PHYSICAL EXAM
[General Appearance - Alert] : alert [General Appearance - In No Acute Distress] : in no acute distress [Impaired Insight] : insight and judgment were intact [Auscultation Breath Sounds / Voice Sounds] : lungs were clear to auscultation bilaterally [Musculoskeletal - Swelling] : no joint swelling seen [No Focal Deficits] : no focal deficits [Sclera] : the sclera and conjunctiva were normal [Outer Ear] : the ears and nose were normal in appearance [Oropharynx] : the oropharynx was normal [Neck Appearance] : the appearance of the neck was normal [Heart Sounds] : normal S1 and S2 [Murmurs] : no murmurs [] : no rash [FreeTextEntry1] : tremor L hand, no wrist or foot drop

## 2025-05-06 NOTE — CONSULT LETTER
[Dear  ___] : Dear  [unfilled], [Please see my note below.] : Please see my note below. [Sincerely,] : Sincerely, [FreeTextEntry3] : Indira Tejeda MD , [facility] at [facility] Division of Rheumatology

## 2025-05-22 NOTE — PHYSICAL EXAM
[Normal] : supple, no neck mass and thyroid not enlarged [Normal Neck Lymph Nodes] : normal neck lymph nodes  [Normal Supraclavicular Lymph Nodes] : normal supraclavicular lymph nodes [Normal Groin Lymph Nodes] : normal groin lymph nodes [Normal Axillary Lymph Nodes] : normal axillary lymph nodes [Normal] : oriented to person, place and time, with appropriate affect [FreeTextEntry1] : SS present during physical exam.  [de-identified] : right axillary incision well healed.  ?palpable Inguinal lymphadenopathy

## 2025-05-22 NOTE — ASSESSMENT
[FreeTextEntry1] :  We discussed a repeat PET/CT to reassess for areas of increased FDG avidity  I will also ask Ann Marie to undergo an ultrasound-guided biopsy of the right inguinal area or any of increased uptake on PET.  We also discussed infectious disease evaluation.  She also wishes to see the benign hematology service at [facility].  All medical entries were at my, Dr. Kane Mooney, direction. I have reviewed the chart and agree that the record accurately reflects my personal performance of the history, physical exam, assessment, and plan.  Our office nurse practitioner was present for the duration of the office visit.

## 2025-05-22 NOTE — HISTORY OF PRESENT ILLNESS
[de-identified] : Ms. JANETH KOEHLER is a 61-year-old woman here for a follow-up visit.   She underwent bilateral mastectomies in November 2010.  She was seen for initial consultation in December 2017 for evaluation of right axillary lymphadenopathy and a history of left sided breast cancer.   She was referred for a CTA of the chest in August 2017 given complaints of dyspnea, which was negative for PE but revealed right-sided axillary adenopathy measuring up to 1.9 cm.  She was referred for a bilateral breast/chest wall ultrasound in November 2017, which revealed benign appearing nodules in the right breast for which a 6 month follow up exam was recommended, and an unremarkable axillary lymph node with normal fatty hilum (BIRADS 3).  She has also been experiencing abdominal/epigastric pain of unclear etiology.  She has been worked up by GI, Cardiology and neurology with no significant findings.  She had a recent repeat EGD (7/2018) and CT (6/2018) which were both unrevealing.  She will continue to follow up with Dr. Hernandez regarding this.  She was prescribed Hyoscyamine and Baclofen.  She was subsequently referred for a breast MRI which showed right axillary adenopathy (3.4 cm) for which FNA was recommended.  FNA was done on 1/9/18 and was negative for malignant cells and consistent with a reactive lymph node.  As such, we agreed to short term surveillance with a follow up ultrasound.  **SURGERY** After discussing the case with radiology, we decided to proceed with an excisional biopsy.  She is status post excisional biopsy of right axillary lymph node on 3/14/18.  Final pathology was consistent with lymph nodes with follicular hyperplasia and plasmacytosis.  No diagnostic abnormalities were seen on flow cytometry.  US bilateral breasts 9/30/20: No sonographic evidence of malignancy.  Further management of pain should be based clinically, BI-RADS 1.   MR Bilateral Breasts 10/30/20: No MRI evidence of malignancy, BI-RADS 2.   She underwent breast reconstruction revision in December 2020.   CT Abdomen/Pelvis 2/11/21: Tiny portion of small bowel interposed between the rectus musculature and ventral hernia mesh in the left lower quadrant. No hernia identified. No bowel obstruction.  PET/CT Skull to Thigh 3/4/21:  1)Patient is status post interval removal of bilateral breast implants with BRIANNA flap reconstruction, as seen on interval CT scans. In anterior aspect of reconstructed left breast, there is a photopenic density which measures slightly higher than simple fluid and demonstrate minimal, peripheral FDG activity. This increased in size since 10/31/2019, measuring approximately 9.2 x 2.8 cm (SUV 2.3; image 81), as compared to 4.3 x 1.2 cm on 10/31/2019. No suspicious enhancement is seen in reconstructed left breast on MRI from 10/30/2020. This may represent a postoperative seroma. Ultrasound is recommended for further evaluation.  2. Status post interval partial right sternectomy with chronic fracture deformity of mid sternum as seen on interval CT scans. There is a small focus of mild FDG activity associated with the fracture deformity which is unchanged in appearance on interval CT scans. Resolution of infiltrative changes seen in chest wall, surrounding sternal fracture deformity, on interval CT scans. 3. New nonspecific subcentimeter FDG-avid right axillary lymph node. 4. A tiny portion of small bowel is interposed between the rectus musculature and the ventral hernia mesh in the left lower quadrant, as seen on CT dated 2/11/2021.  US Breast Bilateral 3/22/21: Large complicated fluid collection in the reconstructed left upper breast with internal debris and septations, new since prior breast MRI from 10/2020. The differential considerations include inflammation/infection, however malignant recurrence or implant associated lymphoma cannot be excluded. Ultrasound-guided aspiration is recommended. Further management and assessment on clinical grounds is also suggested.  BI-RADS 4A.   BREAST, LEFT UPPER, US GUIDED FNA 4/6/21:  NEGATIVE FOR MALIGNANT CELLS. (Results are BENIGN AND CONCORDANT).  She underwent umbilical revision on 4/16/21.   She was referred for a CTA of the chest on 6/13/21 to evaluate shortness of breath, which was negative for PE but demonstrated a 8 x 1.8 cm left breast collection (previously aspirated).  This has been asymptomatic, however, she briefly discussed surgical intervention for definitive treatment with Dr. Vasquez.  She subsequently underwent a  follow up breast ultrasound on 9/1/21 which revealed an 8.3 x 4.3 x 0.4 cm fluid collection in the upper reconstructed left breast at the scar site, decreased in size from prior study (BIRADS 2).   Her past medical history is otherwise notable for SVT (s/p cardiac ablation 2012- currently on metoprolol), MS, GERD.  Prior surgeries include a cholecystectomy, hysterectomy and umbilical hernia repair.  B/L breast U/S from 11/29/22 showed probable fat necrosis in the right breast at 2:00, 7 cm from the nipple, at the medial portion of the scar site. Follow-up sonogram in 6 months is recommended to ascertain stability. BI-RADS 3 Breast MRI 12/2/22 with benign findings, BI-RADS 2.   12/22/2022- Janeth presents for a follow-up visit, she is most recently s/p revision of her B/L reconstructed breast with exploration of left breast, seroma removal, fat grafting and liposuction in January of 2022 w/ Dr. Vasquez.  PET/CT from 12/2022 shows new hypermetabolic uptake in multiple right axillary nodes, at least 3 new examples. Dr. Syed then sent her back to me to discuss new findings. She is also undergoing multiple GI testing with Dr. Harris and parathyroid work-up with Dr. Cazares. Janeth will undergo as right ultrasound guided biopsy of the axillary lymph nodes. She will follow up with us shortly thereafter.  Spoke with Janeth on 1/7/2023, attempt at US biopsy showed no discreet LN, will order a chest CT to reevaluate.   CT chest from 1/15/2023 showed right subpectoral and supraclavicular lymph nodes are unchanged in size and appearance since 12/2/2022 PET/CT exam. No new findings within the chest  1/26/2023: Janeth returns to discuss her CT scan findings.  She does mention occasional night sweats and fatigue as constitutional symptoms. I will review her CT imaging with radiology.  She may be amenable to a CT-guided biopsy.  We will touch base next week.  s/p CT guided bx of retropectoral LN on 3/10/2023 favors reactive LN.  B/L breast U/S 5/31/2023, BI-RADS 2 --- however there were no comments made on her axilla regions. right axilla U/S 7/14/2023 - no evidence of LAD, previously biopsied right retro pectoral LN not seen   CT chest 8/29/2023 - unchanged right subpectoral & axillary LN   9/21/2023 - Janeth returns for ongoing follow-up, she does still report night sweats but otherwise no remaining B symptoms. No LAD on physical exam and she otherwise is in her usual state of health.   CT chest 2/2024 - new nonspecific 3 mm LLL nodule, compared to 8/2023 - stable sub-4 mm DARRYL nodules, including previously new 2 mm nodule  - stable sub centimeter right axillary/subpectoral LN   3/21/2024 - Janeth returns for ongoing follow-up, she is feeling well but does report ongoing night sweats 3-4 nights a week. She has been dealing with some GI issues/IBS but otherwise denies any new health issues.   PET/CT 4/7/2024: Few small FDG-avid right axillary/retropectoral lymph nodes are mildly increased in size and metabolism, a difficult to delineate right presacral lymph node is increased in metabolism, and several small FDG-avid right inguinal lymph nodes are unchanged in size and increased in metabolism. Findings are nonspecific. Differential diagnosis includes lymphoma. Metastatic breast cancer is unlikely. Further evaluation may be performed with ultrasound-guided percutaneous needle biopsy. Small FDG-avid focus in anterior abdominal wall, just to the left of midline, and slightly above the level of the umbilicus, not significantly changed, possibly related to prior surgery. Please correlate clinically and with ultrasound.  5/13/2024- Janeth returns for a presurgical consultation and to evaluate abdominal wall findings on PET/CT.  Janeth is scheduled for surgery 5/17.    **SURGERY** She is status post right axillary lymph node excision on 5/17/2024.  Pathology revealed follicular and paracortical hyperplasia with NO overt evidence of lymphoma or carcinoma.     Postop course complicated by hematoma for which she underwent wash out/evacuation of right axillary hematoma on 5/20/2024.  Hospital course prolonged due to inability to achieve therapeutic anticoagulation.  6/10/2024: Janeth is feeling well.  Incision is healing appropriately, and she denies any further drainage, no swelling or pain.  Therapeutic on anticoagulation at this point. Janeth is doing well.  She will follow up with us in 6 months.  CT C/A/P 8/26/2024 - no evidence of metastatic disease - post op changes in left breast, similar to prior CT  - no LAD, previously noted hematoma to right axilla no longer seen, surgical clips to B/L axilla   9/19/2024 - Janeth returns for ongoing follow-up, she continues to report persistent night sweats and intermittently during the day. She recently recovered from a bout of GI issues (Sapovirus, E. coli, and C. diff that were identified by GI PCR and treated with ABTs). She is otherwise in her usual state of health and denies any new/worrisome symptoms.  Janeth will follow-up in 1 year, she will continue close follow-up with Dr. Syed and possibly rheumatology.   12/10/2024- Janeth returns for ongoing follow-up, she is feeling well overall, still dealing with some GI issues but actively following w/ GI. She continues to have persistent night sweats but has established care w/ Dr. Zita Segura from [facility] who is doing the full work-up. She otherwise denies any new or worrisome symptoms.  Janeth will follow-up in 1 year, she will continue close follow-up with Dr. Syed and rheumatology.   CT A/P 5/2025 - rounding RIGHT inguinal node, 1.8 cm, w/ adjacent metallic clip - may be secondary to previous bx, correlate clinically  *no additional enlarged LNs are seen   5/22/2025 - Janeth is here for a follow-up visit after her recent scan. She is in her usual state of health - continues to have persistent night sweats, GI issues persist (now following w/ Dr. Ann-Marie Harris) but otherwise is in her usual state of health. She has not established with a new med onc, Dr. Syed is no longer practicing. Her new PCP is Dr. Dony Alford @ [facility]. She is unsure if the right groin LN has been sampled in the past.

## 2025-05-22 NOTE — CONSULT LETTER
[Consult Letter:] : I had the pleasure of evaluating your patient, [unfilled]. [Please see my note below.] : Please see my note below. [Consult Closing:] : Thank you very much for allowing me to participate in the care of this patient.  If you have any questions, please do not hesitate to contact me. [Sincerely,] : Sincerely, [DrLane  ___] : Dr. BUCKLEY [DrLane ___] : Dr. BUCKLEY [Dear  ___] : Dear  [unfilled], [FreeTextEntry2] : Dony Alford MD [FreeTextEntry3] : Kane Mooney MD Surgical Oncology [facility] Office: [phone] Cell: [phone]

## 2025-05-22 NOTE — REASON FOR VISIT
PATIENT REVIEW OF SYSTEMS     General ROS:  1.   Constitutional No   2.   Eyes No   3.   Cardiac No   4.   Respiratory No   5.   Gastrointestinal No   6.   Genitourinary Yes    7.   Musculoskeletal Yes    8.   Endocrine No   9.   Hematologic/Lymphatic No   10. Integument No   11. Neurological Yes    12. Allergy/Immunologic Yes    13. Psychiatric No     RN present in exam room during patient encounter.      [Follow-Up Visit] : a follow-up visit for

## 2025-06-11 NOTE — PHYSICAL EXAM
[Fully active, able to carry on all pre-disease performance without restriction] : Status 0 - Fully active, able to carry on all pre-disease performance without restriction [Normal] : affect appropriate [de-identified] : b/l reconstructed breasts

## 2025-06-11 NOTE — HISTORY OF PRESENT ILLNESS
[Disease: _____________________] : Disease: [unfilled] [de-identified] : This is a 61yf with PMHx breast cancer s/p b/l mastectomy/chemo/radiation, PE on coumadin, RAFFAELE, multiple sclerosis, and gastroparesis who presents at the request of her breast surgeon for oncology follow-up. She has been under the medical oncology care of Dr. Syed (no longer practicing).  Oncology history (review of records available): 11/2010-Status post bilateral mastectomies-  8/2017-Complained of dyspnea and had a CT angiogram of the chest negative for PE, but revealed right-sided axillary adenopathy measuring up to 1.9 cm. 11/2017Bilateral breast/chest wall ultrasound showed benign-appearing nodules in the right breast for which a 6-month follow-up exam was recommended, and an unremarkable axillary lymph node with normal fatty hilum. 1/9/2018-Breast MRI-right axillary adenopathy (3.4 cm) for which FNA was recommended.  FNA done 1/9/2018 was negative for malignant cells and consistent with a reactive lymph node. 3/14/2018-Status post excisional right axillary lymph node biopsy with pathology consistent with follicular hyperplasia and plasmacytosis.  Flow cytometry reportedly unremarkable.  9/30/2020-Bilateral breast ultrasound-no sonographic evidence of malignancy. 10/30/2020-Bilateral breast MRI-no MRI evidence of malignancy. 12/2020-Status post breast reconstruction revision.  3/4/2021-PET/CT scan: 1)Patient is status post interval removal of bilateral breast implants with BRIANNA flap reconstruction, as seen on interval CT scans. In anterior aspect of reconstructed left breast, there is a photopenic density which measures slightly higher than simple fluid and demonstrate minimal, peripheral FDG activity. This increased in size since 10/31/2019, measuring approximately 9.2 x 2.8 cm (SUV 2.3; image 81), as compared to 4.3 x 1.2 cm on 10/31/2019. No suspicious enhancement is seen in reconstructed left breast on MRI from 10/30/2020. This may represent a postoperative seroma. Ultrasound is recommended for further evaluation. 2. Status post interval partial right sternectomy with chronic fracture deformity of mid sternum as seen on interval CT scans. There is a small focus of mild FDG activity associated with the fracture deformity which is unchanged in appearance on interval CT scans. Resolution of infiltrative changes seen in chest wall, surrounding sternal fracture deformity, on interval CT scans. 3. New nonspecific subcentimeter FDG-avid right axillary lymph node. 4. A tiny portion of small bowel is interposed between the rectus musculature and the ventral hernia mesh in the left lower quadrant, as seen on CT dated 2/11/2021.  3/22/2021-Bilateral breast ultrasound-Large complicated fluid collection in the reconstructed left upper breast with internal debris and septations, new since prior breast MRI from 10/2020. The differential considerations include inflammation/infection, however malignant recurrence or implant associated lymphoma cannot be excluded. Ultrasound-guided aspiration is recommended. 4/6/2021-BREAST, LEFT UPPER, US GUIDED FNA: NEGATIVE FOR MALIGNANT CELLS. (Results are BENIGN AND CONCORDANT).  6/13/2021-Patient was referred for a CTA of the chest to evaluate shortness of breath, which was negative for PE but demonstrated a 8 x 1.8 cm left breast collection (previously aspirated).  9/1/2021-She subsequently underwent a follow up breast ultrasound which revealed an 8.3 x 4.3 x 0.4 cm fluid collection in the upper reconstructed left breast at the scar site, decreased in size from prior study.  11/29/2022-B/L breast U/S-probable fat necrosis in the right breast at 2:00, 7 cm from the nipple, at the medial portion of the scar site. Follow-up sonogram in 6 months is recommended to ascertain stability. 12/2022-PET/CT showed new hypermetabolic uptake in multiple right axillary nodes, at least 3 new examples. 1/7/2023-Attempt at US biopsy showed no discreet LN. 1/15/2023-CT chest- showed right subpectoral and supraclavicular lymph nodes are unchanged in size and appearance since 12/2/2022 PET/CT exam. No new findings within the chest. 3/10/2023-s/p CT guided bx of retropectoral LN favors reactive LN.  8/29/2023-CT chest - unchanged right subpectoral & axillary LN.  4/7/2024-PET/CT scan-Few small FDG-avid right axillary/retropectoral lymph nodes are mildly increased in size and metabolism, a difficult to delineate right presacral lymph node is increased in metabolism, and several small FDG-avid right inguinal lymph nodes are unchanged in size and increased in metabolism. Findings are nonspecific. Differential diagnosis includes lymphoma. Metastatic breast cancer is unlikely. Further evaluation may be performed with ultrasound-guided percutaneous needle biopsy. Small FDG-avid focus in anterior abdominal wall, just to the left of midline, and slightly above the level of the umbilicus, not significantly changed, possibly related to prior surgery. Please correlate clinically and with ultrasound. 5/17/2024-Status post right axillary lymph node excision. Pathology revealed follicular and paracortical hyperplasia with NO overt evidence of lymphoma or carcinoma. 8/26/2024-CT C/A/P: - no evidence of metastatic disease - post op changes in left breast, similar to prior CT - no LAD, previously noted hematoma in the right axilla no longer seen, surgical clips to B/L axilla  5/16/2025-CT A/P: - rounding RIGHT inguinal node, 1.8 cm, w/ adjacent metallic clip - may be secondary to previous bx, correlate clinically *no additional enlarged LNs are seen  6/10/2025-PET/CT scan: 1. Several FDG-avid small and mildly enlarged right inguinal lymph nodes are increased in size and number and are similar in metabolism findings are nonspecific. Correlate with pending right inguinal lymph node biopsy performed immediately following FDG-PET/CT on 6/10/2025. 2. Few small FDG-avid right retropectoral lymph nodes are decreased in number and metabolism. 3. Resolution of hypermetabolism associated with a nonenlarged right presacral lymph node which is similar in appearance on CT. 6/10/2025-Right inguinal LN biopsy-  Has long h/o night sweats. Has had extensive GI evaluation in the past for GI issues. Sees ID specialist and is now seeing rheumatology. Wishes for referral to benign hematology as well. Has been seeing Dr. Mooney. [de-identified] : PCP is Dr. Dony Alford.

## 2025-06-11 NOTE — ASSESSMENT
[FreeTextEntry1] : Pathology reports, CT scan reports, PET/CT scan reports, 5/22/2025 surgical oncology note, 5/6/2025 rheumatology note 4/21/2025 GI note, Lab work reviewed. Patient has been under the medical oncology care of Dr. Syed (no longer practicing). 11/2010-Status post bilateral mastectomies (no records available for review)- Patient has been under surveillance with her breast surgeon Dr. Vizcarra for years, she has had waxing/waning lymphadenopathy on imaging with FNAs/biopsies favoring a reactive process thus far.    Most recent 6/10/2025-PET/CT scan: 1. Several FDG-avid small and mildly enlarged right inguinal lymph nodes are increased in size and number and are similar in metabolism findings are nonspecific. Correlate with pending right inguinal lymph node biopsy performed immediately following FDG-PET/CT on 6/10/2025. 2. Few small FDG-avid right retropectoral lymph nodes are decreased in number and metabolism. 3. Resolution of hypermetabolism associated with a nonenlarged right presacral lymph node which is similar in appearance on CT. 6/10/2025-Right inguinal LN biopsy done-pathology showed  -- Patient without clear evidence of recurrent breast cancer at this time.  Recommend surveillance. -- Rheumatology follow-up, ID follow-up -- Will make a referral to benign hematology at patient request  Patient was given the opportunity to ask questions.  Her questions have been answered to her apparent satisfaction at this time.  She wishes for continued medical oncology follow-up.  -->RTO 3-6 months??

## 2025-06-11 NOTE — CONSULT LETTER
[Dear  ___] : Dear  [unfilled], [Consult Letter:] : I had the pleasure of evaluating your patient, [unfilled]. [Please see my note below.] : Please see my note below. [Consult Closing:] : Thank you very much for allowing me to participate in the care of this patient.  If you have any questions, please do not hesitate to contact me. [Sincerely,] : Sincerely, [FreeTextEntry3] : Anna Parra MD [DrLane  ___] : Dr. BUCKLEY

## 2025-06-11 NOTE — HISTORY OF PRESENT ILLNESS
[Disease: _____________________] : Disease: [unfilled] [de-identified] : This is a 61yf with PMHx breast cancer s/p b/l mastectomy/chemo/radiation, PE on coumadin, RAFFAELE, multiple sclerosis, and gastroparesis who presents at the request of her breast surgeon for oncology follow-up. She has been under the medical oncology care of Dr. Syed (no longer practicing).  Oncology history (review of records available): 11/2010-Status post bilateral mastectomies-  8/2017-Complained of dyspnea and had a CT angiogram of the chest negative for PE, but revealed right-sided axillary adenopathy measuring up to 1.9 cm. 11/2017Bilateral breast/chest wall ultrasound showed benign-appearing nodules in the right breast for which a 6-month follow-up exam was recommended, and an unremarkable axillary lymph node with normal fatty hilum. 1/9/2018-Breast MRI-right axillary adenopathy (3.4 cm) for which FNA was recommended.  FNA done 1/9/2018 was negative for malignant cells and consistent with a reactive lymph node. 3/14/2018-Status post excisional right axillary lymph node biopsy with pathology consistent with follicular hyperplasia and plasmacytosis.  Flow cytometry reportedly unremarkable.  9/30/2020-Bilateral breast ultrasound-no sonographic evidence of malignancy. 10/30/2020-Bilateral breast MRI-no MRI evidence of malignancy. 12/2020-Status post breast reconstruction revision.  3/4/2021-PET/CT scan: 1)Patient is status post interval removal of bilateral breast implants with BRIANNA flap reconstruction, as seen on interval CT scans. In anterior aspect of reconstructed left breast, there is a photopenic density which measures slightly higher than simple fluid and demonstrate minimal, peripheral FDG activity. This increased in size since 10/31/2019, measuring approximately 9.2 x 2.8 cm (SUV 2.3; image 81), as compared to 4.3 x 1.2 cm on 10/31/2019. No suspicious enhancement is seen in reconstructed left breast on MRI from 10/30/2020. This may represent a postoperative seroma. Ultrasound is recommended for further evaluation. 2. Status post interval partial right sternectomy with chronic fracture deformity of mid sternum as seen on interval CT scans. There is a small focus of mild FDG activity associated with the fracture deformity which is unchanged in appearance on interval CT scans. Resolution of infiltrative changes seen in chest wall, surrounding sternal fracture deformity, on interval CT scans. 3. New nonspecific subcentimeter FDG-avid right axillary lymph node. 4. A tiny portion of small bowel is interposed between the rectus musculature and the ventral hernia mesh in the left lower quadrant, as seen on CT dated 2/11/2021.  3/22/2021-Bilateral breast ultrasound-Large complicated fluid collection in the reconstructed left upper breast with internal debris and septations, new since prior breast MRI from 10/2020. The differential considerations include inflammation/infection, however malignant recurrence or implant associated lymphoma cannot be excluded. Ultrasound-guided aspiration is recommended. 4/6/2021-BREAST, LEFT UPPER, US GUIDED FNA: NEGATIVE FOR MALIGNANT CELLS. (Results are BENIGN AND CONCORDANT).  6/13/2021-Patient was referred for a CTA of the chest to evaluate shortness of breath, which was negative for PE but demonstrated a 8 x 1.8 cm left breast collection (previously aspirated).  9/1/2021-She subsequently underwent a follow up breast ultrasound which revealed an 8.3 x 4.3 x 0.4 cm fluid collection in the upper reconstructed left breast at the scar site, decreased in size from prior study.  11/29/2022-B/L breast U/S-probable fat necrosis in the right breast at 2:00, 7 cm from the nipple, at the medial portion of the scar site. Follow-up sonogram in 6 months is recommended to ascertain stability. 12/2022-PET/CT showed new hypermetabolic uptake in multiple right axillary nodes, at least 3 new examples. 1/7/2023-Attempt at US biopsy showed no discreet LN. 1/15/2023-CT chest- showed right subpectoral and supraclavicular lymph nodes are unchanged in size and appearance since 12/2/2022 PET/CT exam. No new findings within the chest. 3/10/2023-s/p CT guided bx of retropectoral LN favors reactive LN.  8/29/2023-CT chest - unchanged right subpectoral & axillary LN.  4/7/2024-PET/CT scan-Few small FDG-avid right axillary/retropectoral lymph nodes are mildly increased in size and metabolism, a difficult to delineate right presacral lymph node is increased in metabolism, and several small FDG-avid right inguinal lymph nodes are unchanged in size and increased in metabolism. Findings are nonspecific. Differential diagnosis includes lymphoma. Metastatic breast cancer is unlikely. Further evaluation may be performed with ultrasound-guided percutaneous needle biopsy. Small FDG-avid focus in anterior abdominal wall, just to the left of midline, and slightly above the level of the umbilicus, not significantly changed, possibly related to prior surgery. Please correlate clinically and with ultrasound. 5/17/2024-Status post right axillary lymph node excision. Pathology revealed follicular and paracortical hyperplasia with NO overt evidence of lymphoma or carcinoma. 8/26/2024-CT C/A/P: - no evidence of metastatic disease - post op changes in left breast, similar to prior CT - no LAD, previously noted hematoma in the right axilla no longer seen, surgical clips to B/L axilla  5/16/2025-CT A/P: - rounding RIGHT inguinal node, 1.8 cm, w/ adjacent metallic clip - may be secondary to previous bx, correlate clinically *no additional enlarged LNs are seen  6/10/2025-PET/CT scan: 1. Several FDG-avid small and mildly enlarged right inguinal lymph nodes are increased in size and number and are similar in metabolism findings are nonspecific. Correlate with pending right inguinal lymph node biopsy performed immediately following FDG-PET/CT on 6/10/2025. 2. Few small FDG-avid right retropectoral lymph nodes are decreased in number and metabolism. 3. Resolution of hypermetabolism associated with a nonenlarged right presacral lymph node which is similar in appearance on CT. 6/10/2025-Right inguinal LN biopsy-  Has long h/o night sweats. Has had extensive GI evaluation in the past for GI issues. Sees ID specialist and is now seeing rheumatology. Wishes for referral to benign hematology as well. Has been seeing Dr. Mooney. [de-identified] : PCP is Dr. Dony Alford.

## 2025-06-19 NOTE — REASON FOR VISIT
[Subsequent Evaluation] : a subsequent evaluation for [FreeTextEntry2] : s/p Resection of left superior parathyroid 06/26/23

## 2025-06-19 NOTE — HISTORY OF PRESENT ILLNESS
[de-identified] : 61 year old female presents for follow-up s/p Resection of left superior parathyroid 06/26/23, was last seen in Aug 2024.  History of breast cancer, s/p chemotherapy 11/2010-04/2011. Previous history of kidney stones. Pt endo is Claudia Henriquez, she saw him about 2-3 months ago.  labs 4/8/25: PTH= 27, Calcium= 9.9  Today pt denies dysphagia, dyspnea, dysphonia. Eating and drinking without issues  Reports tingling sensation around the mouth and in the fingers everyday lasts a few seconds and goes away on its own. Has been going on since surgery,  but now occurring more frequently. Still has tingling in toes, unsure if from her neuropathy, getting worked up with neurologist.  Taking Vitamin D3 with calcium by mouth daily.

## 2025-06-19 NOTE — CONSULT LETTER
[Courtesy Letter:] : I had the pleasure of seeing your patient, [unfilled], in my office today. [Please see my note below.] : Please see my note below. [Consult Closing:] : Thank you very much for allowing me to participate in the care of this patient.  If you have any questions, please do not hesitate to contact me. [Sincerely,] : Sincerely, [FreeTextEntry2] : Cuba Henriquez [FreeTextEntry3] : \par  Rony Cazares MD, FACS\par  \par  Otolaryngology-Head and Neck Surgery\par  [employer] [facility]\par

## 2025-06-19 NOTE — PHYSICAL EXAM
[Midline] : trachea located in midline position [Normal] : no rashes [de-identified] : Incision C/D/I.

## 2025-06-23 NOTE — ASSESSMENT
[FreeTextEntry1] : 60 yo F with PMHx breast cancer s/p b/l mastectomy/chemo/radiation, PE on coumadin, RAFFAELE, multiple sclerosis, IBS, presenting with night sweats, LAD Aside from night sweats, LAD. primary symptoms are GI which have been attributed to IBS (stomach cramps, diarrhea) Workup has been extensive Overall, Diarrhea, Night Sweats - Monitor off antibiotics - Check CBC, Strongy Abs, stool ova and parasite

## 2025-06-23 NOTE — HISTORY OF PRESENT ILLNESS
[FreeTextEntry1] : "60 yo F with PMHx breast cancer s/p b/l mastectomy/chemo/radiation, PE on coumadin, RAFFAELE, multiple sclerosis, and gastroparesis who presents at the request of her breast surgeon for oncology follow-up Long Onc history with Breast CA, s/p mastectomy, chemo, radiation On breast reconstruction eval, had YVONNE breast debris and septations; there was concern for possible malignancy recurrence  1/15/2023-CT chest- showed right subpectoral and supraclavicular lymph nodes are unchanged in size and appearance since 12/2/2022 PET/CT exam. No new findings within the chest. 3/10/2023-s/p CT guided bx of retropectoral LN favors reactive LN 8/29/2023-CT chest - unchanged right subpectoral & axillary LN  4/7/2024-PET/CT scan-Few small FDG-avid right axillary/retropectoral lymph nodes are mildly increased in size and metabolism, a difficult to delineate right presacral lymph node is increased in metabolism, and several small FDG-avid right inguinal lymph nodes are unchanged in size and increased in metabolism. Findings are nonspecific. Differential diagnosis includes lymphoma. Metastatic breast cancer is unlikely. Further evaluation may be performed with ultrasound-guided percutaneous needle biopsy. Small FDG-avid focus in anterior abdominal wall, just to the left of midline, and slightly above the level of the umbilicus, not significantly changed, possibly related to prior surgery. Please correlate clinically and with ultrasound.  5/17/2024-Status post right axillary lymph node excision. Pathology revealed follicular and paracortical hyperplasia with NO overt evidence of lymphoma or carcinoma. 8/26/2024-CT C/A/P: - no evidence of metastatic disease - post op changes in left breast, similar to prior CT - no LAD, previously noted hematoma in the right axilla no longer seen, surgical clips to B/L axilla  5/16/2025-CT A/P: - rounding RIGHT inguinal node, 1.8 cm, w/ adjacent metallic clip - may be secondary to previous bx, correlate clinically *no additional enlarged LNs are seen  6/10/2025-PET/CT scan: 1. Several FDG-avid small and mildly enlarged right inguinal lymph nodes are increased in size and number and are similar in metabolism findings are nonspecific. Correlate with pending right inguinal lymph node biopsy performed immediately following FDG-PET/CT on 6/10/2025. 2. Few small FDG-avid right retropectoral lymph nodes are decreased in number and metabolism. 3. Resolution of hypermetabolism associated with a nonenlarged right presacral lymph node which is similar in appearance on CT.  6/10/2025-Right inguinal LN biopsy: LYMPH NODE, GROIN, RIGHT, US-GUIDED CORE BIOPSY AND FNA: Reactive lymph node. See note and description. Diagnostic note: The findings are those of reactive non-specific changes in a lymph node. There is no diagnostic evidence of lymphoma, metastatic carcinoma, IgG4-related lymphadenopathy, or granulomatous/infectious process."  Above reviewed from Hematology note Had removal of implants because of pain; no further No complaints at breasts since  MS, off medications, on surveillance with MRIs Last flare in 2018 Lips and fingers tingling  Today, having pain at groin site where they had biopsy In axilla, had LNs prior, which have been waxing and waning (currently gone) Most recently pain at groin LNs  When patient sleeps, wakes up soaking wet--occurring over past year No fevers, no chills Back pain at paraspinal muscles  Born in the [place], always lived in [place] Retired from [employer] No outdoor exposure Prior history of pets; gerbil/hamsters, turtles  Recently had Colonoscopy/Endoscopy, 3/2025 Has episodes of cramping and pain Diarrhea throughout the day Was treated for C diff Intermittently GI PCR positive

## 2025-07-07 NOTE — HISTORY OF PRESENT ILLNESS
[FreeTextEntry1] : 61-year-old female with RAFFAELE/MS/history of mild gastroparesis/Sapovirus/EAEC/EPEC/norovirus/Giardia in 2024 and January 2025 here for follow-up care, had colonoscopy with biopsies demonstrating lymphoid aggregates, negative for colitis, was complaining of severe abdominal pain periumbilical, radiating to the back and epigastric area relieved by having a bowel movement and significant loose stools back in April here for follow-up care.  Her workup has included stool studies which were negative for EPI/protein-losing enteropathy She was taking dicyclomine with good improvement of abdominal spasms but had to be discontinued secondary to glaucoma She had a repeat nuclear medicine gastric emptying scan which demonstrated 12% retention of food contents at our 4, normal small bowel and normal colonic emptying She currently is now not having any loose bowel movements is having fully formed thinner stools, 1-2 times per day She is off all laxative agents She feels incomplete evacuation and is asking for something to help move her bowels

## 2025-07-07 NOTE — END OF VISIT
[FreeTextEntry3] : 40 minutes of this encounter includes both face-to-face and non-face-to-face time to review images, labs, other consultation notes and generation of this document.

## 2025-07-07 NOTE — ASSESSMENT
[FreeTextEntry1] : 61-year-old female with RAFFAELE/MS/mild gastroparesis by nuclear study 2025 with 12% retention at 4 hours, history of EAEC/EPEC/norovirus/Giardia/Sapovirus here for follow-up care, now with normal bowel movements but still feeling incomplete evacuation.  Also with some abdominal discomfort/spasms.  She was to start oil of oregano tablets but they do interfere with Coumadin so this was discontinued.  1.  start fodzyme for digestive health 2.  Reassured patient that infectious workup ordered by her ID physician has all been negative, negative O&P, negative Strongyloides.  I reassured her that on her most recent CT in May 2025, her pancreas looks to be within normal limits. 3.  Follow-up in 6 months

## 2025-07-21 NOTE — PHYSICAL EXAM
[FreeTextEntry1] : NEUROLOGICAL EXAM Mental status: The patient is alert, attentive and conversational memory intact. Speech/language: No dysarthria Cranial nerves: CN II: Visual fields are full to confrontation. Pupil size equal and briskly reactive to light (pinpoint pupils). CN III, IV, VI: EOMI, no nystagmus, no ptosis CN V: Facial sensation is intact to pinprick in all 3 divisions bilaterally. CN VII: Face is symmetric with normal eye closure and smile. CN VII: Hearing is normal to rubbing fingers CN IX, X: Palate elevates symmetrically. Phonation is normal. CN XI: Head turning and shoulder shrug are intact CN XII: Tongue is midline with normal movements and no atrophy. Motor: Strength is full bilaterally. 5/5 muscle power in bilateral UE and LE. Reflexes: R L  Biceps 2+ 2+ Brachioradialis 2+ 2+  Patellar 1+ 1+  Achilles 1+ 1+ Plantar responses- R down, L down Sensory: LT/PP/TEMP/JPS sensation intact UE and LE.  Mildly diminished vibratory sense in distal LE in length dependent fashion Coordination: There is no dysmetria on finger-to-nose and heel to shin. Mild intention tremor on the L. Gait/Stance: Posture is normal. Gait is steady with normal steps, base, arm swing, and turning. imbalance on tandem gait. Romberg is negative.

## 2025-07-21 NOTE — DATA REVIEWED
[de-identified] :  MR Brain 8/2024 and MR 8/2024 C/T spine stable-No new lesions MRI brain w/w/o 6/14/23- stable, c/w 11/2021 MRI C and T spine w/w/o 6/12/2023- stable, c/w 7/7/22  I personally reviewed MRI scans- MRI C/T spine 7/2022- stable c/w 2018. Stable chronic small left T3 and T4 cord lesion MRI brain 11/2021- stable MRI brain 9/2020- stable MRI Brain 2018- stable c/w 2016 and 2017. Scattered mild- mod supratentorial periventricular and subcortical WM lesions. Lesions run perpendicular to ventricles. + CC lesions (punctuate). NO infratentorial lesions. MRI C T spine 2016- T1 enh lesion, non enh lesion T3-4 and T5-6

## 2025-07-21 NOTE — HISTORY OF PRESENT ILLNESS
[FreeTextEntry1] : INTERIM HX 07/21/2025: no new neurological symptoms at this time.  Continues to numbness/tingling in b/l fingertips (R>L), unchanged. Described as "ants crawling". Ongoing now for 7 months. Comes and goes. Denies pain, burning in hands.  Neuropathy symptoms stable. Chronic in feet, more intense in the last few years but not spreading.  Continues to struggle with IBS mixed. EMG/NCS normal 5/20/2025. MS mimicker/neuropathy labs-labs reviewed from 4/8/2025 ESR 22, CRP 9 C3 (192) and C4 (47) elevated remaining labs all WNL  INTERIM HX 04/08/2025: Dealing with IBS symptoms. Continues to experience numbness/tingling in feet and in hands (finger tips) as well, intermittent in hands now x 6 months, feet are constant.  INTERIM HX 8/26/2024: Saw a cardiologist Dr Griggs, had an EEG and Echo cardio reported normal. Doing well, Stable no new MS symptoms. Reports chronic neuropathy affecting her feet, that she feels now in her calves only at night. On gabapentin 400mg 4x/ day. MR Brain 8/2024 and MR 8/2024 C/T spine stable-No new lesions  INTERIM HX 03/21/2024: Doing well. Here to discuss alternative DMT options. Aubagio was approved by insurance but has a high copay ($200).  INTERIM HX 02/15/2024: She is stable. no new symptoms. Never started aubagio (generic)? due to high copay. Tingling in feet, worse at night, on gabapentin 300 mg TID.  INTERIM HX 10/16/2023: Never started Aubagio, insurance denied brand and approved generic, however pt never got the med. Stable, no issues.  INTERIM HX 06/20/2023: MRI brain w/w/o 6/14/23- stable, c/w 11/2021      MRI C and T spine w/w/o 6/12/2023- stable, c/w 7/7/22 CBC w/ diff WNL. Quant TB neg. Elevated ALP (known hx of NAFLD) and followed by Hepatology and elevated calcium 11.1 (known hx of hyperparathyroidism) She is stable, no new MS symptom. She is having parathyroidectomy next Monday.  --------------------------------------------------------------------------------------------------------------------------------- HPI (initial visit May 12, 2023)- JANETH KOEHLER is a 59-year-old woman referred for hx of MS.  She has a hx of Breast CA (dx'd 2011, s/p b/l mastectomies and chemo), Leiomyoma s/o VENKAT/BSO 2011, DVT/PE 2019 (on warfarin), SVT s/p cardiac ablation, HLD, HTN, RAFFAELE on Cpap, NAFLD, IBS/GERD, Glaucoma.  She was diagnosed with MS in 2005 -R optic neuritis, was admitted to hospital ([facility]), had abnormal brain MRI and diagnosed with MS. At same time, also loss bladder control and had gait imbalance. Got IV steroids. She also had spinal tap (+ OCB). She stabilized and symptoms resolved. She started to see Dr. Chrissie Seth, has been seeing her ever since (now she is no longer accepting insurance). Initially on copaxone (allergic rxn) > plegridy > and then started on Tecfidera ~ 2018. Only had one other relapse (~ 2015)- lost control of bowel and gait was affected, this was when she was on one of the injectables, got steroids in hospital. Diagnosed w/ transverse myelitis. Been clinically and radiologically since starting Tecfidera- though has not been consistently taking the medication. She says she self dc'd Tecfidera 3 months ago- made her feel weaker, "horrible stomach symptoms"-stomach cramping and diarrhea. Never took the medication consistently. She was also tried on vumerity, could not tolerate this either.  + Nocturia. Occasional walking difficulty-"Legs get tired", numbness/tingling in feet ("they said it is chemo induced"- on gabapentin)  I personally reviewed MRI scans- MRI C/T spine 7/2022- stable c/w 2018. Stable chronic small left T3 and T4 cord lesion MRI brain 11/2021- stable MRI brain 9/2020- stable MRI Brain 2018- stable c/w 2016 and 2017. Scattered mild- mod supratentorial periventricular and subcortical WM lesions. Lesions run perpendicular to ventricles. + CC lesions (punctuate). NO infratentorial lesions. MRI C T spine 2016- T1 enh lesion, non enh lesion T3-4 and T5-6  Labs 1/2023- Vit D 38.4  7/2022- ALKP 170, AST and ALT normal

## 2025-07-21 NOTE — ASSESSMENT
[FreeTextEntry1] : 61-year-old female w/ hx of relapsing multiple sclerosis, diagnosed in 2005, with initial attack of right optic neuritis and myelitis, last relapse in 2015 (thoracic myelitis). She had tried multiple injectable in the past, with side effects and one clinical disease breakthrough, and was later switched to Tecfidera in 2020 but self d'c'd 2/2023 due to GI side effects (did not tolerate Vumerity either). She has been clinically and radiologically stable since at least 2016. We had recommended she switch to Aubagio which has a good safety profile; however, patient was not able to afford the med due to high copay. Given her age and clinical/radiological stability, we mutually decided on avoiding DMT escalation and just monitoring closely.  # FARZANA dx'2005, off DMT since 2/2023 due to intolerance- Stable disease # Peripheral neuropathy- unclear etiology. LE symptoms are chronic and likely related to chemotherapy. UE symptoms since early 2025. Less likely related to her MS but will also obtain MR scans.  --RRMS 1. Diagnostic Plan/Imaging: Repeat MRI brain and cervical/thoracic spine w/w/o contrast in 8/2025.- ordered& scheduled  2. Disease Modifying therapy plan: Monitor off DMT for now, will monitor MRI annually.  3. Symptomatic therapy plan: () Neuropathic pain: On gabapentin 600 mg TID () Vitamin D3 and B12 supplementations  --Paresthesia's - concern for neuropathy. Symmetric distal UE and LE.  (chronic LE x years since breast CA chemo), new UE neuropathy since early 2025. STABLE.  Gabapentin 600 mg TID, start Cymbalta 30mg nightly labs reviewed from 4/8/2025- ESR 22, CRP  9, C3 (192) and C4 (47) elevated -remaining neuropathy labs all WNL Seen by Rheum, no concern for systemic AI disease at this time.  EMG performed by Dr. Foley 5/20/25 - normal study consider skin biopsy in the future if repeat MRIs are stable & paresthesia's worsen  Return to clinic 4 months